# Patient Record
Sex: FEMALE | Race: WHITE | NOT HISPANIC OR LATINO | Employment: FULL TIME | URBAN - METROPOLITAN AREA
[De-identification: names, ages, dates, MRNs, and addresses within clinical notes are randomized per-mention and may not be internally consistent; named-entity substitution may affect disease eponyms.]

---

## 2020-06-02 ENCOUNTER — TELEPHONE (OUTPATIENT)
Dept: GASTROENTEROLOGY | Facility: CLINIC | Age: 65
End: 2020-06-02

## 2020-06-18 ENCOUNTER — TELEPHONE (OUTPATIENT)
Dept: GASTROENTEROLOGY | Facility: CLINIC | Age: 65
End: 2020-06-18

## 2020-06-19 ENCOUNTER — OFFICE VISIT (OUTPATIENT)
Dept: GASTROENTEROLOGY | Facility: CLINIC | Age: 65
End: 2020-06-19
Payer: COMMERCIAL

## 2020-06-19 VITALS
HEIGHT: 64 IN | BODY MASS INDEX: 30.41 KG/M2 | DIASTOLIC BLOOD PRESSURE: 78 MMHG | WEIGHT: 178.13 LBS | SYSTOLIC BLOOD PRESSURE: 122 MMHG | HEART RATE: 89 BPM | TEMPERATURE: 98.2 F

## 2020-06-19 DIAGNOSIS — R10.84 GENERALIZED ABDOMINAL PAIN: ICD-10-CM

## 2020-06-19 DIAGNOSIS — K21.9 GASTROESOPHAGEAL REFLUX DISEASE WITHOUT ESOPHAGITIS: Primary | ICD-10-CM

## 2020-06-19 PROCEDURE — 99244 OFF/OP CNSLTJ NEW/EST MOD 40: CPT | Performed by: INTERNAL MEDICINE

## 2020-06-19 RX ORDER — PANTOPRAZOLE SODIUM 40 MG/1
40 TABLET, DELAYED RELEASE ORAL 2 TIMES DAILY
Qty: 60 TABLET | Refills: 2 | Status: SHIPPED | OUTPATIENT
Start: 2020-06-19 | End: 2020-11-17

## 2020-06-19 RX ORDER — OMEPRAZOLE 40 MG/1
40 CAPSULE, DELAYED RELEASE ORAL DAILY
COMMUNITY
Start: 2018-01-12 | End: 2020-06-19

## 2020-06-19 RX ORDER — LISINOPRIL 5 MG/1
5 TABLET ORAL DAILY
COMMUNITY
Start: 2018-01-12 | End: 2021-04-07

## 2020-07-17 ENCOUNTER — HOSPITAL ENCOUNTER (OUTPATIENT)
Dept: RADIOLOGY | Facility: HOSPITAL | Age: 65
Discharge: HOME | End: 2020-07-17
Attending: INTERNAL MEDICINE
Payer: COMMERCIAL

## 2020-07-17 DIAGNOSIS — R10.84 GENERALIZED ABDOMINAL PAIN: ICD-10-CM

## 2020-07-17 DIAGNOSIS — K21.9 GASTROESOPHAGEAL REFLUX DISEASE WITHOUT ESOPHAGITIS: ICD-10-CM

## 2020-07-17 PROCEDURE — 74246 X-RAY XM UPR GI TRC 2CNTRST: CPT

## 2020-07-20 ENCOUNTER — TELEPHONE (OUTPATIENT)
Dept: GASTROENTEROLOGY | Facility: CLINIC | Age: 65
End: 2020-07-20

## 2020-07-20 NOTE — TELEPHONE ENCOUNTER
Left a voicemail for patient advising of normal study results  Advised if she has any questions to contact the office

## 2020-07-20 NOTE — TELEPHONE ENCOUNTER
----- Message from Mina Pickard MD sent at 7/20/2020  8:22 AM EDT -----  pls tell her the Xray study shows no cancer, no ulcer, and nothing alarming; pls tell her it is normal; she has FU scheduled with Nate Mosqueda

## 2020-07-20 NOTE — TELEPHONE ENCOUNTER
Pt called again I relayed her results of xray to her and she stated she will call back to schedule a f/u with [facility]

## 2020-09-24 ENCOUNTER — TELEPHONE (OUTPATIENT)
Dept: GASTROENTEROLOGY | Facility: CLINIC | Age: 65
End: 2020-09-24

## 2020-09-24 NOTE — TELEPHONE ENCOUNTER
EUFEMIA: Spoke with patient  History of GERD, chest pain, abdominal pain, heart burn    Patient c/o upper abdominal pain, and constipation for 2 weeks, with odorous gas  She states she has been taking mylanta daily which is not helping her constipation  She verbalized understanding that is for reflux  She is taking pantoprazole BID  Advised patient she should be taking miralax if she is constipated, she will restart this daily with her prune juice and follow-up in office

## 2020-09-24 NOTE — TELEPHONE ENCOUNTER
Stone July pt-  Patient has been experiencing terrbile stomach pain with gas     It has been happening for over 2 weeks @ 2 AM or later     Uses: [facility] [phone]  She has scheduled an appt 11/17 with Dr Ritter July     Please phone [phone] to advise  Bryn Ozuna

## 2020-11-17 ENCOUNTER — OFFICE VISIT (OUTPATIENT)
Dept: GASTROENTEROLOGY | Facility: CLINIC | Age: 65
End: 2020-11-17
Payer: COMMERCIAL

## 2020-11-17 VITALS
HEART RATE: 76 BPM | SYSTOLIC BLOOD PRESSURE: 124 MMHG | WEIGHT: 185 LBS | HEIGHT: 64 IN | TEMPERATURE: 98.2 F | DIASTOLIC BLOOD PRESSURE: 82 MMHG | BODY MASS INDEX: 31.58 KG/M2

## 2020-11-17 DIAGNOSIS — K21.9 GASTROESOPHAGEAL REFLUX DISEASE WITHOUT ESOPHAGITIS: ICD-10-CM

## 2020-11-17 DIAGNOSIS — K59.01 SLOW TRANSIT CONSTIPATION: ICD-10-CM

## 2020-11-17 DIAGNOSIS — R10.84 GENERALIZED ABDOMINAL PAIN: Primary | ICD-10-CM

## 2020-11-17 PROCEDURE — 99213 OFFICE O/P EST LOW 20 MIN: CPT | Performed by: INTERNAL MEDICINE

## 2020-11-17 RX ORDER — PANTOPRAZOLE SODIUM 40 MG/1
40 TABLET, DELAYED RELEASE ORAL DAILY
Qty: 60 TABLET | Refills: 2 | Status: SHIPPED | OUTPATIENT
Start: 2020-11-17

## 2021-04-07 ENCOUNTER — OFFICE VISIT (OUTPATIENT)
Dept: INTERNAL MEDICINE CLINIC | Facility: CLINIC | Age: 66
End: 2021-04-07
Payer: COMMERCIAL

## 2021-04-07 VITALS
TEMPERATURE: 98.2 F | WEIGHT: 183.4 LBS | HEART RATE: 82 BPM | DIASTOLIC BLOOD PRESSURE: 84 MMHG | BODY MASS INDEX: 31.31 KG/M2 | SYSTOLIC BLOOD PRESSURE: 124 MMHG | HEIGHT: 64 IN | OXYGEN SATURATION: 100 %

## 2021-04-07 DIAGNOSIS — B37.0 ORAL THRUSH: ICD-10-CM

## 2021-04-07 DIAGNOSIS — Z78.0 ASYMPTOMATIC POSTMENOPAUSAL STATE: ICD-10-CM

## 2021-04-07 DIAGNOSIS — M54.42 CHRONIC LEFT-SIDED LOW BACK PAIN WITH LEFT-SIDED SCIATICA: ICD-10-CM

## 2021-04-07 DIAGNOSIS — K59.01 SLOW TRANSIT CONSTIPATION: ICD-10-CM

## 2021-04-07 DIAGNOSIS — Z12.12 SCREENING FOR COLORECTAL CANCER: ICD-10-CM

## 2021-04-07 DIAGNOSIS — Z11.59 NEED FOR HEPATITIS C SCREENING TEST: ICD-10-CM

## 2021-04-07 DIAGNOSIS — G89.29 CHRONIC LEFT-SIDED LOW BACK PAIN WITH LEFT-SIDED SCIATICA: ICD-10-CM

## 2021-04-07 DIAGNOSIS — Z13.6 SCREENING FOR CARDIOVASCULAR CONDITION: ICD-10-CM

## 2021-04-07 DIAGNOSIS — Z12.31 ENCOUNTER FOR SCREENING MAMMOGRAM FOR BREAST CANCER: ICD-10-CM

## 2021-04-07 DIAGNOSIS — Z12.11 SCREENING FOR COLORECTAL CANCER: ICD-10-CM

## 2021-04-07 DIAGNOSIS — Z11.4 SCREENING FOR HIV (HUMAN IMMUNODEFICIENCY VIRUS): ICD-10-CM

## 2021-04-07 DIAGNOSIS — K21.9 GASTROESOPHAGEAL REFLUX DISEASE WITHOUT ESOPHAGITIS: Primary | ICD-10-CM

## 2021-04-07 DIAGNOSIS — E66.9 OBESITY (BMI 30-39.9): ICD-10-CM

## 2021-04-07 PROBLEM — R76.11 MANTOUX: POSITIVE: Status: ACTIVE | Noted: 2020-01-20

## 2021-04-07 PROBLEM — I10 ESSENTIAL HYPERTENSION: Status: ACTIVE | Noted: 2018-01-12

## 2021-04-07 PROBLEM — R31.29 MICROSCOPIC HEMATURIA: Status: ACTIVE | Noted: 2020-01-27

## 2021-04-07 PROBLEM — I10 ESSENTIAL HYPERTENSION: Status: RESOLVED | Noted: 2018-01-12 | Resolved: 2021-04-07

## 2021-04-07 PROCEDURE — 99214 OFFICE O/P EST MOD 30 MIN: CPT | Performed by: INTERNAL MEDICINE

## 2021-04-07 NOTE — PROGRESS NOTES
INTERNAL MEDICINE OFFICE VISIT  [facility] BEH[facility] MEDIC[facility] AT [place] [address]  Tel: [phone]      NAME: Yohannes Rodríguez  AGE: 72 y o  SEX: female  : 1955   MRN: [id number]    DATE: 2021  TIME: 5:51 PM      Assessment and Plan:  1  Gastroesophageal reflux disease without esophagitis   was told to continue taking the pantoprazole  - CBC and differential; Future  - Comprehensive metabolic panel; Future    2  Slow transit constipation   was advised to take the stool softener and MiraLax and follow up with GI    3  Chronic left-sided low back pain with left-sided sciatica   take pain medication as needed  She does not want to do physical therapy again    4  Oral thrush    - nystatin (MYCOSTATIN) 500,000 units/5 mL suspension; Apply 5 mL (500,000 Units total) to the mouth or throat 4 (four) times a day  Dispense: 473 mL; Refill: 3    5  Obesity (BMI 30-39  9)  BMI Counseling: Body mass index is 31 98 kg/m²  The BMI is above normal  Nutrition recommendations include decreasing portion sizes, encouraging healthy choices of fruits and vegetables and moderation in carbohydrate intake  Exercise recommendations include moderate physical activity 150 minutes/week  No pharmacotherapy was ordered  6  Screening for colorectal cancer    - Ambulatory referral to Gastroenterology; Future    7  Need for hepatitis C screening test    - Hepatitis C Antibody ([facility]); Future    8  Screening for HIV (human immunodeficiency virus)    - HIV 1/2 Antigen/Antibody (4th Generation) w Reflex [facility]; Future    9  Encounter for screening mammogram for breast cancer    - Mammo screening bilateral w 3d & cad; Future    10  Asymptomatic postmenopausal state    - DXA bone density spine hip and pelvis; Future    11  Screening for cardiovascular condition    - Lipid panel;  Future      - Counseling Documentation: patient was counseled regarding: diagnostic results, instructions for management, risk factor reductions, prognosis, patient and family education, risks and benefits of treatment options and importance of compliance with treatment  - Medication Side Effects: Adverse side effects of medications were reviewed with the patient/guardian today  Return for follow up visit in  4 months or earlier, if needed  Chief Complaint:  Chief Complaint   Patient presents with    Establish Care         History of Present Illness:    this is a new patient who is here to establish  GERD-she recently had a EGD done and was started on pantoprazole by GI   Constipation -she has a lot of constipation which causes lower abdominal pain  She was told to take MiraLax twice a week  Back pain -has chronic back pain for a long time with the pain radiating down to the left leg  She has done physical therapy multiple times in the past   Oral thrush -has sores in the mouth off and on   Obesity-was told to lose weight      Active Problem List:  Patient Active Problem List   Diagnosis    GERD (gastroesophageal reflux disease)    Generalized abdominal pain    Slow transit constipation    Mantoux: positive    Microscopic hematuria    Chronic left-sided low back pain with left-sided sciatica    Obesity (BMI 30-39  9)         Past Medical History:  Past Medical History:   Diagnosis Date    GERD (gastroesophageal reflux disease)          Past Surgical History:  Past Surgical History:   Procedure Laterality Date    TUBAL LIGATION           Family History:  Family History   Problem Relation Age of Onset    Diabetes Mother     Kidney disease Mother     Stroke Father          Social History:  Social History     Socioeconomic History    Marital status: Unknown     Spouse name: None    Number of children: None    Years of education: None    Highest education level: None   Occupational History    None   Social Needs    Financial resource strain: None    Food insecurity     Worry: None Inability: None    Transportation needs     Medical: None     Non-medical: None   Tobacco Use    Smoking status: Never Smoker    Smokeless tobacco: Never Used   Substance and Sexual Activity    Alcohol use: Never     Frequency: Never    Drug use: Never    Sexual activity: Not Currently     Partners: Male   Lifestyle    Physical activity     Days per week: 0 days     Minutes per session: 0 min    Stress: None   Relationships    Social connections     Talks on phone: None     Gets together: None     Attends Gnosticist service: None     Active member of club or organization: None     Attends meetings of clubs or organizations: None     Relationship status: None    Intimate partner violence     Fear of current or ex partner: None     Emotionally abused: None     Physically abused: None     Forced sexual activity: None   Other Topics Concern    None   Social History Narrative    None         Allergies:  No Known Allergies      Medications:    Current Outpatient Medications:     pantoprazole (PROTONIX) 40 mg tablet, Take 1 tablet (40 mg total) by mouth daily, Disp: 60 tablet, Rfl: 2    nystatin (MYCOSTATIN) 500,000 units/5 mL suspension, Apply 5 mL (500,000 Units total) to the mouth or throat 4 (four) times a day, Disp: 473 mL, Rfl: 3      The following portions of the patient's history were reviewed and updated as appropriate: past medical history, past surgical history, family history, social history, allergies, current medications and active problem list       Review of Systems:  Constitutional: Denies fever, chills, weight gain, weight loss, fatigue  Eyes: Denies eye redness, eye discharge, double vision, change in visual acuity  ENT: Denies hearing loss, tinnitus, sneezing, nasal congestion, nasal discharge, sore throat   Respiratory: Denies cough, expectoration, hemoptysis, shortness of breath, wheezing  Cardiovascular: Denies chest pain, palpitations, lower extremity swelling, orthopnea, PND  Gastrointestinal:  Complains of constipation and generalized abdominal pain, heartburn, denies nausea, vomiting, hematemesis, diarrhea, bloody stools  Genito-Urinary: Denies dysuria, frequency, difficulty in micturition, nocturia, incontinence  Musculoskeletal:  Has frequent back pain, denies joint pain, muscle pain  Neurologic: Denies confusion, lightheadedness, syncope, headache, focal weakness, sensory changes, seizures  Endocrine: Denies polyuria, polydipsia, temperature intolerance  Allergy and Immunology: Denies hives, insect bite sensitivity  Hematological and Lymphatic: Denies bleeding problems, swollen glands   Psychological: Denies depression, suicidal ideation, anxiety, panic, mood swings  Dermatological: Denies pruritus, rash, skin lesion changes      Vitals:  Vitals:    04/07/21 1721   BP: 124/84   Pulse: 82   Temp: 98 2 °F (36 8 °C)   SpO2: 100%       Body mass index is 31 98 kg/m²  Weight (last 2 days)     Date/Time   Weight    04/07/21 1721   83 2 (183 4)                Physical Examination:  General: Patient is not in acute distress  Awake, alert, responding to commands  No weight gain or loss  Head: Normocephalic  Atraumatic  Eyes: Conjunctiva and lids with no swelling, erythema or discharge  Both pupils normal sized, round and reactive to light  Sclera nonicteric  ENT: External examination of nose and ear normal  Otoscopic examination shows translucent tympanic membranes with patent canals without erythema  Oropharynx moist with no erythema, edema, exudate or lesions  Neck: Supple  JVP not raised  Trachea midline  No masses  No thyromegaly  Lungs: No signs of increased work of breathing or respiratory distress  Bilateral bronchovascular breath sounds with no crackles or rhonchi  Chest wall: No tenderness  Cardiovascular: Normal PMI  No thrills  Regular rate and rhythm  S1 and S2 normal  No murmur, rub or gallop  Gastrointestinal: Abdomen soft, nontender  No guarding or rigidity   Liver and spleen not palpable  Bowel sounds present  Neurologic: Cranial nerves II-XII intact  Cortical functions normal  Motor system - Reflexes 2+ and symmetrical  Sensations normal  Musculoskeletal: Gait normal  No joint tenderness  Integumentary: Skin normal with no rash or lesions  Lymphatic: No palpable lymph nodes in neck, axilla or groin  Extremities: No clubbing, cyanosis, edema or varicosities  Psychological: Judgement and insight normal  Mood and affect normal      Laboratory Results:  CBC with diff:   No results found for: WBC, RBC, HGB, HCT, MCV, MCH, RDW, PLT    CMP:  No results found for: CREATININE, BUN, NA, K, CL, CO2, GLUCOSE, PROT, ALKPHOS, ALT, AST, BILIDIR    No results found for: HGBA1C, MG, PHOS    No results found for: TROPONINI, CKMB, CKTOTAL    Lipid Profile:   No results found for: CHOL  No results found for: HDL  No results found for: LDLCALC  No results found for: TRIG    Imaging Results:  FL UGI w/ air routine  Narrative: UPPER GI SERIES  DOUBLE CONTRAST    INDICATION:  Gastroesophageal reflux disease  COMPARISON:  None    IMAGES:  15    FLUOROSCOPY TIME:   1 7 mft    TECHNIQUE:  The patient was given effervescent granules and barium  by mouth and images of the esophagus, stomach, and small bowel were obtained  FINDINGS:    The esophagus is normal in caliber  Esophageal motility is normal and emptying of contrast from the esophagus is prompt  There is no mucosal mass, ulceration or fold thickening identified  The stomach is unremarkable in size  The gastric mucosa is normal   No penetrating ulcers or masses  Contrast empties promptly into the duodenum  The duodenum is normal in caliber  The ligament of Treitz/duodenojejunal junction lies in a normal position  Gastroesophageal reflux was not observed  There is no hiatal hernia  Impression: Unremarkable upper GI series      Workstation performed: [id number]       Health Maintenance:  Health Maintenance   Topic Date Due  Hepatitis C Screening  Never done    MAMMOGRAM  Never done    DXA SCAN  Never done    HIV Screening  Never done    BMI: Followup Plan  Never done    Annual Physical  Never done    DTaP,Tdap,and Td Vaccines (1 - Tdap) Never done    Colorectal Cancer Screening  Never done    Influenza Vaccine (1) Never done    Pneumococcal Vaccine: 65+ Years (1 of 1 - PPSV23) Never done    COVID-19 Vaccine (2 - Pfizer 2-dose series) 02/15/2021    Fall Risk  04/07/2022    Depression Screening PHQ  04/07/2022    BMI: Adult  04/07/2022    HIB Vaccine  Aged Out    Hepatitis B Vaccine  Aged Out    IPV Vaccine  Aged Out    Hepatitis A Vaccine  Aged Out    Meningococcal ACWY Vaccine  Aged Out    HPV Vaccine  Aged Out     Immunization History   Administered Date(s) Administered    SARS-CoV-2 / COVID-19 mRNA IM (Pfizer-BioNTech) 01/25/2021         Ilsa Panda MD  4/7/2021,5:51 PM

## 2021-05-15 ENCOUNTER — APPOINTMENT (OUTPATIENT)
Dept: LAB | Facility: CLINIC | Age: 66
End: 2021-05-15
Payer: COMMERCIAL

## 2021-05-15 DIAGNOSIS — Z13.6 SCREENING FOR CARDIOVASCULAR CONDITION: ICD-10-CM

## 2021-05-15 DIAGNOSIS — K21.9 GASTROESOPHAGEAL REFLUX DISEASE WITHOUT ESOPHAGITIS: ICD-10-CM

## 2021-05-15 DIAGNOSIS — Z11.4 SCREENING FOR HIV (HUMAN IMMUNODEFICIENCY VIRUS): ICD-10-CM

## 2021-05-15 DIAGNOSIS — Z11.59 NEED FOR HEPATITIS C SCREENING TEST: ICD-10-CM

## 2021-05-15 LAB
ALBUMIN SERPL BCP-MCNC: 3.7 G/DL (ref 3.5–5)
ALP SERPL-CCNC: 96 U/L (ref 46–116)
ALT SERPL W P-5'-P-CCNC: 27 U/L (ref 12–78)
ANION GAP SERPL CALCULATED.3IONS-SCNC: 2 MMOL/L (ref 4–13)
AST SERPL W P-5'-P-CCNC: 11 U/L (ref 5–45)
BASOPHILS # BLD AUTO: 0.02 THOUSANDS/ΜL (ref 0–0.1)
BASOPHILS NFR BLD AUTO: 0 % (ref 0–1)
BILIRUB SERPL-MCNC: 0.7 MG/DL (ref 0.2–1)
BUN SERPL-MCNC: 13 MG/DL (ref 5–25)
CALCIUM SERPL-MCNC: 10.1 MG/DL (ref 8.3–10.1)
CHLORIDE SERPL-SCNC: 110 MMOL/L (ref 100–108)
CHOLEST SERPL-MCNC: 190 MG/DL (ref 50–200)
CO2 SERPL-SCNC: 28 MMOL/L (ref 21–32)
CREAT SERPL-MCNC: 0.74 MG/DL (ref 0.6–1.3)
EOSINOPHIL # BLD AUTO: 0.24 THOUSAND/ΜL (ref 0–0.61)
EOSINOPHIL NFR BLD AUTO: 4 % (ref 0–6)
ERYTHROCYTE [DISTWIDTH] IN BLOOD BY AUTOMATED COUNT: 13.7 % (ref 11.6–15.1)
GFR SERPL CREATININE-BSD FRML MDRD: 85 ML/MIN/1.73SQ M
GLUCOSE P FAST SERPL-MCNC: 94 MG/DL (ref 65–99)
HCT VFR BLD AUTO: 45.3 % (ref 34.8–46.1)
HCV AB SER QL: NORMAL
HDLC SERPL-MCNC: 66 MG/DL
HGB BLD-MCNC: 13.7 G/DL (ref 11.5–15.4)
IMM GRANULOCYTES # BLD AUTO: 0.01 THOUSAND/UL (ref 0–0.2)
IMM GRANULOCYTES NFR BLD AUTO: 0 % (ref 0–2)
LDLC SERPL CALC-MCNC: 107 MG/DL (ref 0–100)
LYMPHOCYTES # BLD AUTO: 2.15 THOUSANDS/ΜL (ref 0.6–4.47)
LYMPHOCYTES NFR BLD AUTO: 35 % (ref 14–44)
MCH RBC QN AUTO: 26.7 PG (ref 26.8–34.3)
MCHC RBC AUTO-ENTMCNC: 30.2 G/DL (ref 31.4–37.4)
MCV RBC AUTO: 88 FL (ref 82–98)
MONOCYTES # BLD AUTO: 0.52 THOUSAND/ΜL (ref 0.17–1.22)
MONOCYTES NFR BLD AUTO: 9 % (ref 4–12)
NEUTROPHILS # BLD AUTO: 3.18 THOUSANDS/ΜL (ref 1.85–7.62)
NEUTS SEG NFR BLD AUTO: 52 % (ref 43–75)
NONHDLC SERPL-MCNC: 124 MG/DL
NRBC BLD AUTO-RTO: 0 /100 WBCS
PLATELET # BLD AUTO: 182 THOUSANDS/UL (ref 149–390)
PMV BLD AUTO: 11.6 FL (ref 8.9–12.7)
POTASSIUM SERPL-SCNC: 4.5 MMOL/L (ref 3.5–5.3)
PROT SERPL-MCNC: 7.9 G/DL (ref 6.4–8.2)
RBC # BLD AUTO: 5.14 MILLION/UL (ref 3.81–5.12)
SODIUM SERPL-SCNC: 140 MMOL/L (ref 136–145)
TRIGL SERPL-MCNC: 84 MG/DL
WBC # BLD AUTO: 6.12 THOUSAND/UL (ref 4.31–10.16)

## 2021-05-15 PROCEDURE — 87389 HIV-1 AG W/HIV-1&-2 AB AG IA: CPT

## 2021-05-15 PROCEDURE — 85025 COMPLETE CBC W/AUTO DIFF WBC: CPT

## 2021-05-15 PROCEDURE — 86803 HEPATITIS C AB TEST: CPT

## 2021-05-15 PROCEDURE — 80061 LIPID PANEL: CPT

## 2021-05-15 PROCEDURE — 36415 COLL VENOUS BLD VENIPUNCTURE: CPT

## 2021-05-15 PROCEDURE — 80053 COMPREHEN METABOLIC PANEL: CPT

## 2021-05-17 LAB — HIV 1+2 AB+HIV1 P24 AG SERPL QL IA: NORMAL

## 2021-08-09 ENCOUNTER — TELEPHONE (OUTPATIENT)
Dept: INTERNAL MEDICINE CLINIC | Facility: CLINIC | Age: 66
End: 2021-08-09

## 2021-08-09 NOTE — TELEPHONE ENCOUNTER
Patient has travel plans for early Monday, 8/16/21  Order was put in to have a COVID-19 screening for travel  Patient would like to be put on schedule for test to be done for the 72 hour  Period prior to her travel date  Patient states airline says it must be a PCR test     Let patient know what day she can be added to the schedule    # [phone]

## 2021-08-09 NOTE — TELEPHONE ENCOUNTER
PT is traveling to [place] on Monday and wants to have Covid PCR test done at [facility]  Needs lab order placed in Epic so she can get this done      Please call PT when this is in chart  Al[address]: [phone]

## 2021-08-09 NOTE — TELEPHONE ENCOUNTER
Wing [place] spoke to patient - she needs to get a my chart account  to be able to get the results for Saturday to print out

## 2021-08-13 PROCEDURE — U0003 INFECTIOUS AGENT DETECTION BY NUCLEIC ACID (DNA OR RNA); SEVERE ACUTE RESPIRATORY SYNDROME CORONAVIRUS 2 (SARS-COV-2) (CORONAVIRUS DISEASE [COVID-19]), AMPLIFIED PROBE TECHNIQUE, MAKING USE OF HIGH THROUGHPUT TECHNOLOGIES AS DESCRIBED BY CMS-2020-01-R: HCPCS | Performed by: INTERNAL MEDICINE

## 2021-08-13 PROCEDURE — U0005 INFEC AGEN DETEC AMPLI PROBE: HCPCS | Performed by: INTERNAL MEDICINE

## 2021-08-18 ENCOUNTER — TELEPHONE (OUTPATIENT)
Dept: INTERNAL MEDICINE CLINIC | Facility: CLINIC | Age: 66
End: 2021-08-18

## 2021-08-18 NOTE — TELEPHONE ENCOUNTER
----- Message from Estephania Oglesby MD sent at 8/18/2021  2:58 PM EDT -----   Negative for coronavirus

## 2021-10-04 ENCOUNTER — OFFICE VISIT (OUTPATIENT)
Dept: INTERNAL MEDICINE CLINIC | Facility: CLINIC | Age: 66
End: 2021-10-04
Payer: COMMERCIAL

## 2021-10-04 VITALS
SYSTOLIC BLOOD PRESSURE: 126 MMHG | RESPIRATION RATE: 17 BRPM | OXYGEN SATURATION: 98 % | HEART RATE: 81 BPM | TEMPERATURE: 97.5 F | HEIGHT: 63 IN | DIASTOLIC BLOOD PRESSURE: 78 MMHG | BODY MASS INDEX: 32.71 KG/M2 | WEIGHT: 184.6 LBS

## 2021-10-04 DIAGNOSIS — M54.2 NECK PAIN: Primary | ICD-10-CM

## 2021-10-04 DIAGNOSIS — G89.29 CHRONIC MIDLINE LOW BACK PAIN WITHOUT SCIATICA: ICD-10-CM

## 2021-10-04 DIAGNOSIS — M54.50 CHRONIC MIDLINE LOW BACK PAIN WITHOUT SCIATICA: ICD-10-CM

## 2021-10-04 DIAGNOSIS — R60.0 BILATERAL EDEMA OF LOWER EXTREMITY: ICD-10-CM

## 2021-10-04 PROCEDURE — 99213 OFFICE O/P EST LOW 20 MIN: CPT | Performed by: INTERNAL MEDICINE

## 2022-02-14 ENCOUNTER — OFFICE VISIT (OUTPATIENT)
Dept: INTERNAL MEDICINE CLINIC | Facility: CLINIC | Age: 67
End: 2022-02-14
Payer: COMMERCIAL

## 2022-02-14 VITALS
HEIGHT: 63 IN | WEIGHT: 185.2 LBS | HEART RATE: 90 BPM | DIASTOLIC BLOOD PRESSURE: 74 MMHG | RESPIRATION RATE: 18 BRPM | SYSTOLIC BLOOD PRESSURE: 118 MMHG | BODY MASS INDEX: 32.82 KG/M2 | OXYGEN SATURATION: 97 %

## 2022-02-14 DIAGNOSIS — M25.511 CHRONIC RIGHT SHOULDER PAIN: Primary | ICD-10-CM

## 2022-02-14 DIAGNOSIS — K21.9 GASTROESOPHAGEAL REFLUX DISEASE WITHOUT ESOPHAGITIS: ICD-10-CM

## 2022-02-14 DIAGNOSIS — G89.29 CHRONIC RIGHT SHOULDER PAIN: Primary | ICD-10-CM

## 2022-02-14 PROCEDURE — 99213 OFFICE O/P EST LOW 20 MIN: CPT | Performed by: INTERNAL MEDICINE

## 2022-02-14 NOTE — PROGRESS NOTES
INTERNAL MEDICINE FOLLOW-UP OFFICE VISIT  [facility] of BEHAVIORAL MEDICINE AT [facility]    NAME: Khloe Munguia  AGE: 77 y o  SEX: female  : 1955   MRN: [id number]    DATE: 2022  TIME: 3:12 PM    Assessment and Plan     Diagnoses and all orders for this visit:    Chronic right shoulder pain  -     Ambulatory Referral to Orthopedic Surgery; Future  -     XR shoulder 2+ vw right; Future    Gastroesophageal reflux disease without esophagitis     she was advised to cut down the caffeine, spices and citrus from her diet and continue taking the pantoprazole    - Counseling Documentation: patient was counseled regarding: instructions for management, risk factor reductions, prognosis, patient and family education, risks and benefits of treatment options and importance of compliance with treatment  - Medication Side Effects: Adverse side effects of medications were reviewed with the patient/guardian today  Return to office in:  As needed    Chief Complaint     Chief Complaint   Patient presents with    Hypertension       History of Present Illness     HPI   patient came in because she thought her blood pressure was high but it was absolutely controlled when she was in the office  She has never had high blood pressure in the past     She complains of pain in her right shoulder radiating down to the upper arm  She has been taking the pantoprazole but is continuing to have symptoms of upper abdominal pain  The following portions of the patient's history were reviewed and updated as appropriate: allergies, current medications, past family history, past medical history, past social history, past surgical history and problem list     Review of Systems     Review of Systems   Constitutional: Negative for chills, diaphoresis, fatigue and fever  HENT: Negative for congestion, ear discharge, ear pain, hearing loss, postnasal drip, rhinorrhea, sinus pressure, sinus pain, sneezing, sore throat and voice change  Eyes: Negative for pain, discharge, redness and visual disturbance  Respiratory: Negative for cough, chest tightness, shortness of breath and wheezing  Cardiovascular: Negative for chest pain, palpitations and leg swelling  Gastrointestinal: Positive for abdominal pain  Negative for abdominal distention, blood in stool, constipation, diarrhea, nausea and vomiting  Endocrine: Negative for cold intolerance, heat intolerance, polydipsia, polyphagia and polyuria  Genitourinary: Negative for dysuria, flank pain, frequency, hematuria and urgency  Musculoskeletal: Positive for arthralgias  Negative for back pain, gait problem, joint swelling, myalgias, neck pain and neck stiffness  Has pain in the right shoulder and upper arm   Skin: Negative for rash  Neurological: Negative for dizziness, tremors, syncope, facial asymmetry, speech difficulty, weakness, light-headedness, numbness and headaches  Hematological: Does not bruise/bleed easily  Psychiatric/Behavioral: Negative for behavioral problems, confusion and sleep disturbance  The patient is not nervous/anxious  Active Problem List     Patient Active Problem List   Diagnosis    GERD (gastroesophageal reflux disease)    Generalized abdominal pain    Slow transit constipation    Mantoux: positive    Microscopic hematuria    Chronic midline low back pain without sciatica    Obesity (BMI 30-39  9)    Bilateral edema of lower extremity    Neck pain    Chronic right shoulder pain       Objective     /74 (BP Location: Left arm, Patient Position: Sitting, Cuff Size: Large)   Pulse 90   Resp 18   Ht 5' 3" (1 6 m)   Wt 84 kg (185 lb 3 2 oz)   SpO2 97%   BMI 32 81 kg/m²     Physical Exam  Constitutional:       General: She is not in acute distress  Appearance: She is well-developed  She is not diaphoretic  HENT:      Head: Normocephalic and atraumatic        Right Ear: External ear normal       Left Ear: External ear normal       Nose: Nose normal    Eyes:      General: No scleral icterus  Right eye: No discharge  Left eye: No discharge  Conjunctiva/sclera: Conjunctivae normal    Neck:      Thyroid: No thyromegaly  Vascular: No JVD  Trachea: No tracheal deviation  Cardiovascular:      Rate and Rhythm: Normal rate and regular rhythm  Heart sounds: Normal heart sounds  No murmur heard  No friction rub  No gallop  Pulmonary:      Effort: Pulmonary effort is normal  No respiratory distress  Breath sounds: Normal breath sounds  No wheezing or rales  Chest:      Chest wall: No tenderness  Abdominal:      General: Bowel sounds are normal  There is no distension  Palpations: Abdomen is soft  Tenderness: There is abdominal tenderness  There is no guarding or rebound  Comments:  Has epigastric tenderness   Musculoskeletal:         General: Tenderness present  Normal range of motion  Cervical back: Normal range of motion and neck supple  Comments:  Has tenderness and restriction of movements of the right shoulder   Lymphadenopathy:      Cervical: No cervical adenopathy  Skin:     General: Skin is warm and dry  Findings: No erythema or rash  Neurological:      Mental Status: She is alert and oriented to person, place, and time  Cranial Nerves: No cranial nerve deficit  Motor: No abnormal muscle tone        Coordination: Coordination normal    Psychiatric:         Judgment: Judgment normal            Current Medications       Current Outpatient Medications:     nystatin (MYCOSTATIN) 500,000 units/5 mL suspension, Apply 5 mL (500,000 Units total) to the mouth or throat 4 (four) times a day (Patient taking differently: Apply 500,000 Units to the mouth or throat as needed ), Disp: 473 mL, Rfl: 3    pantoprazole (PROTONIX) 40 mg tablet, Take 1 tablet (40 mg total) by mouth daily, Disp: 60 tablet, Rfl: 2    Health Maintenance     Health Maintenance Topic Date Due    DXA SCAN  Never done    Annual Physical  Never done    DTaP,Tdap,and Td Vaccines (1 - Tdap) Never done    Breast Cancer Screening: Mammogram  Never done    Osteoporosis Screening  Never done    Colorectal Cancer Screening  Never done    Pneumococcal Vaccine: 65+ Years (1 of 1 - PPSV23) Never done    COVID-19 Vaccine (2 - Pfizer 3-dose series) 02/15/2021    Influenza Vaccine (1) Never done    Fall Risk  04/07/2022    Depression Screening  04/07/2022    BMI: Followup Plan  04/07/2022    BMI: Adult  02/14/2023    Hepatitis C Screening  Completed    HIB Vaccine  Aged Out    Hepatitis B Vaccine  Aged Out    IPV Vaccine  Aged Out    Hepatitis A Vaccine  Aged Out    Meningococcal ACWY Vaccine  Aged Out    HPV Vaccine  Aged Out     Immunization History   Administered Date(s) Administered    COVID-19 PFIZER VACCINE 0 3 ML IM 01/25/2021         Irene Peña MD  [address] [facility] of BEHAVIORAL MEDICINE AT [facility]

## 2022-02-28 ENCOUNTER — OFFICE VISIT (OUTPATIENT)
Dept: OBGYN CLINIC | Facility: CLINIC | Age: 67
End: 2022-02-28
Payer: COMMERCIAL

## 2022-02-28 ENCOUNTER — APPOINTMENT (OUTPATIENT)
Dept: RADIOLOGY | Facility: CLINIC | Age: 67
End: 2022-02-28
Payer: COMMERCIAL

## 2022-02-28 VITALS
BODY MASS INDEX: 31.41 KG/M2 | HEIGHT: 64 IN | WEIGHT: 184 LBS | HEART RATE: 94 BPM | DIASTOLIC BLOOD PRESSURE: 79 MMHG | SYSTOLIC BLOOD PRESSURE: 115 MMHG

## 2022-02-28 DIAGNOSIS — M25.561 RIGHT KNEE PAIN, UNSPECIFIED CHRONICITY: ICD-10-CM

## 2022-02-28 DIAGNOSIS — M25.511 CHRONIC RIGHT SHOULDER PAIN: ICD-10-CM

## 2022-02-28 DIAGNOSIS — G89.29 CHRONIC RIGHT SHOULDER PAIN: ICD-10-CM

## 2022-02-28 DIAGNOSIS — M25.511 RIGHT SHOULDER PAIN, UNSPECIFIED CHRONICITY: ICD-10-CM

## 2022-02-28 DIAGNOSIS — M25.562 LEFT KNEE PAIN, UNSPECIFIED CHRONICITY: ICD-10-CM

## 2022-02-28 DIAGNOSIS — M25.511 RIGHT SHOULDER PAIN, UNSPECIFIED CHRONICITY: Primary | ICD-10-CM

## 2022-02-28 DIAGNOSIS — M75.81 TENDINITIS OF RIGHT ROTATOR CUFF: ICD-10-CM

## 2022-02-28 PROCEDURE — 73564 X-RAY EXAM KNEE 4 OR MORE: CPT

## 2022-02-28 PROCEDURE — 99203 OFFICE O/P NEW LOW 30 MIN: CPT | Performed by: ORTHOPAEDIC SURGERY

## 2022-02-28 PROCEDURE — 73030 X-RAY EXAM OF SHOULDER: CPT

## 2022-02-28 NOTE — PROGRESS NOTES
Patient Name:  Earlene Julien  MRN:  [id number]    [address] [place] [address]     1  Right shoulder pain, unspecified chronicity  -     XR shoulder 2+ vw right; Future; Expected date: 02/28/2022    2  Chronic right shoulder pain  -     Ambulatory Referral to Orthopedic Surgery    3  Right knee pain, unspecified chronicity  -     XR knee 4+ vw right injury; Future; Expected date: 02/28/2022    4  Left knee pain, unspecified chronicity  -     XR knee 4+ vw left injury; Future; Expected date: 02/28/2022        77 y o  female with Right shoulder calcific/rotator cuff tendonitis and bilateral knee patellofemoral osteoarthritis  X-rays reviewed in office today with patient  In regards to Right shoulder, advised patient nonoperative management of rotator cuff/calcific tendonitis including outpatient PT for range of motion and strengthening, possible corticosteroid injection, OTC oral analgesics as needed  Patient would like to proceed forward with outpatient PT at this time and hold off on corticosteroid injection  If patient symptoms were to continue despite aforementioned nonoperative management, can consider MRI of Right shoulder for further evaluation of rotator cuff musculature  Verbalized understanding of the above  In regards to bilateral knees, likely patellofemoral etiology of pain discussed with patient  Educated patient about home exercises including short arc quad sets and straight leg raises  Patient may continue OTC oral analgesics and ice application to bilateral knees as needed for pain relief  Will hold off on PT at this time as she is already attending for Right shoulder  Can consider PT script placement for knees at follow up appointment  She will follow up in office in 6-8 weeks for reevaluation of Right shoulder and bilateral knees      Chief Complaint     Right shoulder and bilateral knee pain    History of the Present Illness     Earlene Julien is a 77 y o  female with Right shoulder and bilateral knee pain  In regards to Right shoulder, pain has been ongoing since November without trauma, injury, or inciting event  She notes difficulty with range of motion and overhead lifting  Admits to nighttime awakenings that have persisted through duration of pain since November  She has been administering Aleve for pain relief, but has not yet attended out patient PT  In regards to bilateral knees, patient admits to ongoing pain for years  She locates pain to anteromedial aspect of bilateral knees which is exacerbated with prolonged sitting and attempting to stand and stair ambulation  She occasionally applies heat/ice to bilateral knees for mild pain relief  Review of Systems     Review of Systems   Constitutional: Negative for chills and fever  HENT: Negative for ear pain and sore throat  Eyes: Negative for pain and visual disturbance  Respiratory: Negative for cough and shortness of breath  Cardiovascular: Negative for chest pain and palpitations  Gastrointestinal: Negative for abdominal pain and vomiting  Genitourinary: Negative for dysuria and hematuria  Musculoskeletal: Negative for arthralgias and back pain  Skin: Negative for color change and rash  Neurological: Negative for seizures and syncope  All other systems reviewed and are negative  Physical Exam     /79   Pulse 94   Ht 5' 4" (1 626 m)   Wt 83 5 kg (184 lb)   BMI 31 58 kg/m²     Right Shoulder: Active range of motion   150-160 degrees forward flexion  150-160 degrees abduction  60 degrees external rotation   L1 internal rotation      Passive range of motion   170 degrees of forward flexion   There is tenderness present over the proximal biceps tendon and greater tuberosity of humerus  There is 5/5 strength with external rotation testing at the side      Empty can testing elicits mild pain with subtle weakness  Belly press test is negative  James test is positive  [place]'s test is positive Speed's test is Positive  The patient is neurovascularly intact distally in the extremity  Eyes:  Anicteric sclerae  Neck:  Supple  Lungs:  Normal respiratory effort  Cardiovascular:  Capillary refill is less than 2 seconds  Skin:  Intact without erythema  Neurologic:  Sensation grossly intact to light touch  Psychiatric:  Mood and affect are appropriate  Data Review     I have personally reviewed pertinent films in PACS, and my interpretation follows:    X-rays taken 02/28/2022 of Right shoulder demonstrates calcific density superior to humeral head, possible calcific tendonitis  No acute fracture, dislocation; glenohumeral joint space well maintained  Past Medical History:   Diagnosis Date    GERD (gastroesophageal reflux disease)        Past Surgical History:   Procedure Laterality Date    TUBAL LIGATION         No Known Allergies    Current Outpatient Medications on File Prior to Visit   Medication Sig Dispense Refill    Naproxen Sodium (ALEVE PO) Take by mouth      nystatin (MYCOSTATIN) 500,000 units/5 mL suspension Apply 5 mL (500,000 Units total) to the mouth or throat 4 (four) times a day (Patient taking differently: Apply 500,000 Units to the mouth or throat as needed ) 473 mL 3    pantoprazole (PROTONIX) 40 mg tablet Take 1 tablet (40 mg total) by mouth daily 60 tablet 2     No current facility-administered medications on file prior to visit         Social History     Tobacco Use    Smoking status: Never Smoker    Smokeless tobacco: Never Used   Vaping Use    Vaping Use: Never used   Substance Use Topics    Alcohol use: Never    Drug use: Never       Family History   Problem Relation Age of Onset    Diabetes Mother     Kidney disease Mother     Stroke Father              Procedures Performed     Procedures  None       Maria Guadalupe Larsen PA-C

## 2022-03-25 ENCOUNTER — OFFICE VISIT (OUTPATIENT)
Dept: INTERNAL MEDICINE CLINIC | Facility: CLINIC | Age: 67
End: 2022-03-25
Payer: COMMERCIAL

## 2022-03-25 VITALS
TEMPERATURE: 98 F | HEART RATE: 90 BPM | RESPIRATION RATE: 18 BRPM | WEIGHT: 182.3 LBS | SYSTOLIC BLOOD PRESSURE: 124 MMHG | BODY MASS INDEX: 31.12 KG/M2 | OXYGEN SATURATION: 98 % | DIASTOLIC BLOOD PRESSURE: 82 MMHG | HEIGHT: 64 IN

## 2022-03-25 DIAGNOSIS — L03.116 CELLULITIS OF LEFT LOWER EXTREMITY: Primary | ICD-10-CM

## 2022-03-25 DIAGNOSIS — M25.511 CHRONIC RIGHT SHOULDER PAIN: ICD-10-CM

## 2022-03-25 DIAGNOSIS — G89.29 CHRONIC RIGHT SHOULDER PAIN: ICD-10-CM

## 2022-03-25 PROCEDURE — 99213 OFFICE O/P EST LOW 20 MIN: CPT | Performed by: INTERNAL MEDICINE

## 2022-03-25 RX ORDER — DIAPER,BRIEF,INFANT-TODD,DISP
EACH MISCELLANEOUS 2 TIMES DAILY
COMMUNITY
Start: 2022-03-21 | End: 2022-03-31

## 2022-03-25 RX ORDER — CEPHALEXIN 500 MG/1
500 CAPSULE ORAL EVERY 8 HOURS SCHEDULED
Qty: 21 CAPSULE | Refills: 0 | Status: SHIPPED | OUTPATIENT
Start: 2022-03-25 | End: 2022-04-01

## 2022-03-25 NOTE — PATIENT INSTRUCTIONS
Adhesive Capsulitis   WHAT YOU NEED TO KNOW:   What is adhesive capsulitis? Adhesive capsulitis happens when tissues in your shoulder tighten and swell  The condition is often called frozen shoulder because the swollen tissues cause pain and decrease your shoulder movement  What are the signs and symptoms of adhesive capsulitis? · Shoulder pain and stiffness that gets worse over time    · Pain at night that wakes you    · An ache in your shoulder even at rest but that gets worse with movement    · Muscle spasms in your neck, shoulder joint, or near your collarbone    · Trouble reaching over your head or behind you    · Muscle weakness    What increases my risk for adhesive capsulitis? · Age 36 or older    · Being a woman    · Not being able to do physical activity    · A past shoulder injury or surgery    · A medical condition, such as diabetes, thyroid disease, or heart or lung disease    What are the signs and symptoms of adhesive capsulitis? Adhesive capsulitis may last from several months to years before it gets better on its own  You can have adhesive capsulitis in one or both shoulders  The condition has 3 stages:  · Stage 1  is called the freezing or painful stage and may last 2 to 9 months  · Stage 2  is called the adhesive stage and may last 4 to 12 months  You may have less pain  You may still have pain when you move your arm to reach  Your shoulder may still be stiff, and you may not be able to move your shoulder much  · Stage 3  is the recovery stage and may last from 5 months to more than 2 years  Your shoulder movement may slowly start to get better  You may also begin to have less pain  How is adhesive capsulitis diagnosed? Your healthcare provider will do an exam  He or she will check your neck and shoulder  He or she will check how your shoulder moves and how strong it is  Your provider may move your arm in different positions while you stand or lie down   You may also need the following:  · X-ray or MRI  pictures may be taken of the bones and tissues inside your shoulder  An x-ray may show if your shoulder pain and stiffness is from another medical problem  An MRI may show if your shoulder joint has narrowed  Never enter the MRI room with anything metal  Metal can cause serious injury  Tell the healthcare provider if you have any metal in or on your body  · A joint x-ray  is a picture of the bones and tissues in your joints  You may be given contrast liquid to help the pictures show up better  Tell a healthcare provider if you have ever had an allergic reaction to contrast liquid  How is adhesive capsulitis treated? The goal of treatment is to help you regain as much shoulder movement as possible  Treatment will depend on what stage you are in  Ask your healthcare provider about these and other treatments for adhesive capsulitis:  · Prescription pain medicine  may be given  Ask your healthcare provider how to take this medicine safely  Some prescription pain medicines contain acetaminophen  Do not take other medicines that contain acetaminophen without talking to your healthcare provider  Too much acetaminophen may cause liver damage  Prescription pain medicine may cause constipation  Ask your healthcare provider how to prevent or treat constipation  · NSAIDs  help decrease swelling and pain or fever  This medicine is available with or without a doctor's order  NSAIDs can cause stomach bleeding or kidney problems in certain people  If you take blood thinner medicine, always ask your healthcare provider if NSAIDs are safe for you  Always read the medicine label and follow directions  · Steroid medicine  helps decrease pain and swelling  Healthcare providers may give this medicine as a shot into your shoulder  · Physical therapy:  A physical therapist teaches you exercises to help improve movement and strength, and to decrease pain       · Manipulation  is a procedure used to move your shoulder  You will be given anesthesia to make you sleep through this procedure  Manipulation releases tightness in your shoulder and improves movement  · Surgery  may be used to cut the tissues in your shoulder to release the tightness  During surgery, swollen or damaged tissue may also be removed  Surgery may be needed if other treatments do not help  How can I help manage adhesive capsulitis? · Stretches:      ? Doorway stretch:   a doorway with your painful arm bent at the elbow  Place your hand on the door frame and turn your body away from the door frame  Hold this position for 30 seconds  Relax and repeat  ? Forward stretch:  Lie on your back with your legs straight out  Use your healthy arm to push your painful arm up over your head until you feel a gentle stretch  Hold this position for 15 seconds  Slowly lower your arm to the starting position  Relax and repeat  ? Crossover stretch:  Use your healthy arm to gently pull your painful arm across your chest just below your chin  Pull until you feel a gentle stretch  Hold this position for 30 seconds  Relax and repeat  · Apply ice as directed  Ice helps decrease pain and swelling  Apply ice to help ease pain after stretching  Use an ice pack, or put crushed ice in a plastic bag  Cover it with a towel before you apply it to your shoulder  Apply ice for 15 to 20 minutes every hour, or as directed  · Apply heat as directed  Heat helps relax muscles and may help improve shoulder movement  Use a heat pack, or soak a small towel in warm water  Wring out the extra water before you apply the towel to your shoulder  Apply heat for 20 to 30 minutes every hour, or as directed  When should I seek immediate care? · You have new or increased trouble moving your arm  When should I contact my healthcare provider? · You have worse pain and stiffness in your shoulder      · You have questions or concerns about your condition or care  CARE AGREEMENT:   You have the right to help plan your care  Learn about your health condition and how it may be treated  Discuss treatment options with your healthcare providers to decide what care you want to receive  You always have the right to refuse treatment  The above information is an  only  It is not intended as medical advice for individual conditions or treatments  Talk to your doctor, nurse or pharmacist before following any medical regimen to see if it is safe and effective for you  © Copyright [employer] 2022 Information is for End User's use only and may not be sold, redistributed or otherwise used for commercial purposes   All illustrations and images included in CareNotes® are the copyrighted property of A D A M , Inc  or [address] [place] [address]

## 2022-03-25 NOTE — PROGRESS NOTES
INTERNAL MEDICINE FOLLOW-UP OFFICE VISIT  [facility] of BEHAVIORAL MEDICINE AT [facility]    NAME: Mallory Gagnon  AGE: 77 y o  SEX: female  : 1955   MRN: [id number]    DATE: 3/25/2022  TIME: 4:49 PM    Assessment and Plan     Diagnoses and all orders for this visit:    Cellulitis of left lower extremity  -     cephalexin (KEFLEX) 500 mg capsule; Take 1 capsule (500 mg total) by mouth every 8 (eight) hours for 7 days    Chronic right shoulder pain  Was told to do shoulder exercises and follow-up with orthopedics    Other orders  -     bacitracin ointment; Apply topically 2 (two) times a day        - Counseling Documentation: patient was counseled regarding: instructions for management, risk factor reductions, prognosis, patient and family education, risks and benefits of treatment options and importance of compliance with treatment  - Medication Side Effects: Adverse side effects of medications were reviewed with the patient/guardian today  Return to office in: as needed    Chief Complaint     Chief Complaint   Patient presents with    Follow-up     ED visit [id number] for Left leg pain and swelling        History of Present Illness     HPI  Patient complains of redness and swelling of her left lower extremity for the last few days  She was at the [facility] ER and was given bacitracin ointment and sent back  The redness and swelling continues  She also has a lot of pain in her right shoulder and has been following up with orthopedics     The following portions of the patient's history were reviewed and updated as appropriate: allergies, current medications, past family history, past medical history, past social history, past surgical history and problem list     Review of Systems     Review of Systems   Constitutional: Negative for chills, diaphoresis, fatigue and fever     HENT: Negative for congestion, ear discharge, ear pain, hearing loss, postnasal drip, rhinorrhea, sinus pressure, sinus pain, sneezing, sore throat and voice change  Eyes: Negative for pain, discharge, redness and visual disturbance  Respiratory: Negative for cough, chest tightness, shortness of breath and wheezing  Cardiovascular: Negative for chest pain, palpitations and leg swelling  Gastrointestinal: Negative for abdominal distention, abdominal pain, blood in stool, constipation, diarrhea, nausea and vomiting  Endocrine: Negative for cold intolerance, heat intolerance, polydipsia, polyphagia and polyuria  Genitourinary: Negative for dysuria, flank pain, frequency, hematuria and urgency  Musculoskeletal: Positive for arthralgias  Negative for back pain, gait problem, joint swelling, myalgias, neck pain and neck stiffness  Skin: Positive for color change  Negative for rash  Neurological: Negative for dizziness, tremors, syncope, facial asymmetry, speech difficulty, weakness, light-headedness, numbness and headaches  Hematological: Does not bruise/bleed easily  Psychiatric/Behavioral: Negative for behavioral problems, confusion and sleep disturbance  The patient is not nervous/anxious  Active Problem List     Patient Active Problem List   Diagnosis    GERD (gastroesophageal reflux disease)    Generalized abdominal pain    Slow transit constipation    Mantoux: positive    Microscopic hematuria    Chronic midline low back pain without sciatica    Obesity (BMI 30-39  9)    Bilateral edema of lower extremity    Neck pain    Chronic right shoulder pain       Objective     /82 (BP Location: Left arm, Patient Position: Sitting, Cuff Size: Large)   Pulse 90   Temp 98 °F (36 7 °C) (Tympanic)   Resp 18   Ht 5' 4" (1 626 m)   Wt 82 7 kg (182 lb 4 8 oz)   SpO2 98%   BMI 31 29 kg/m²     Physical Exam  Constitutional:       General: She is not in acute distress  Appearance: She is well-developed  She is not diaphoretic  HENT:      Head: Normocephalic and atraumatic        Right Ear: External ear normal       Left Ear: External ear normal       Nose: Nose normal    Eyes:      General: No scleral icterus  Right eye: No discharge  Left eye: No discharge  Conjunctiva/sclera: Conjunctivae normal    Neck:      Thyroid: No thyromegaly  Vascular: No JVD  Trachea: No tracheal deviation  Cardiovascular:      Rate and Rhythm: Normal rate and regular rhythm  Heart sounds: Normal heart sounds  No murmur heard  No friction rub  No gallop  Pulmonary:      Effort: Pulmonary effort is normal  No respiratory distress  Breath sounds: Normal breath sounds  No wheezing or rales  Chest:      Chest wall: No tenderness  Abdominal:      General: Bowel sounds are normal  There is no distension  Palpations: Abdomen is soft  Tenderness: There is no abdominal tenderness  There is no guarding or rebound  Musculoskeletal:         General: Tenderness present  Normal range of motion  Cervical back: Normal range of motion and neck supple  Comments: Tenderness present in the right shoulder   Lymphadenopathy:      Cervical: No cervical adenopathy  Skin:     General: Skin is warm and dry  Findings: Erythema present  No rash  Comments:  Has cellulitis of the left leg   Neurological:      Mental Status: She is alert and oriented to person, place, and time  Cranial Nerves: No cranial nerve deficit  Motor: No abnormal muscle tone        Coordination: Coordination normal    Psychiatric:         Judgment: Judgment normal            Current Medications       Current Outpatient Medications:     bacitracin ointment, Apply topically 2 (two) times a day, Disp: , Rfl:     nystatin (MYCOSTATIN) 500,000 units/5 mL suspension, Apply 5 mL (500,000 Units total) to the mouth or throat 4 (four) times a day (Patient taking differently: Apply 500,000 Units to the mouth or throat as needed ), Disp: 473 mL, Rfl: 3    pantoprazole (PROTONIX) 40 mg tablet, Take 1 tablet (40 mg total) by mouth daily, Disp: 60 tablet, Rfl: 2    cephalexin (KEFLEX) 500 mg capsule, Take 1 capsule (500 mg total) by mouth every 8 (eight) hours for 7 days, Disp: 21 capsule, Rfl: 0    Health Maintenance     Health Maintenance   Topic Date Due    DXA SCAN  Never done    Annual Physical  Never done    DTaP,Tdap,and Td Vaccines (1 - Tdap) Never done    Breast Cancer Screening: Mammogram  Never done    Osteoporosis Screening  Never done    Colorectal Cancer Screening  Never done    Pneumococcal Vaccine: 65+ Years (1 of 1 - PPSV23) Never done    Influenza Vaccine (1) Never done    COVID-19 Vaccine (3 - Booster for SinCola series) 03/27/2022    Fall Risk  04/07/2022    Depression Screening  04/07/2022    BMI: Followup Plan  04/07/2022    BMI: Adult  03/25/2023    Hepatitis C Screening  Completed    HIB Vaccine  Aged Out    Hepatitis B Vaccine  Aged Out    IPV Vaccine  Aged Out    Hepatitis A Vaccine  Aged Out    Meningococcal ACWY Vaccine  Aged Out    HPV Vaccine  Aged Out     Immunization History   Administered Date(s) Administered    COVID-19 PFIZER VACCINE 0 3 ML IM 01/25/2021, 10/27/2021         Mauri Henderson MD  [id number] [facility] of BEHAVIORAL MEDICINE AT [facility]

## 2022-06-13 ENCOUNTER — OFFICE VISIT (OUTPATIENT)
Dept: OBGYN CLINIC | Facility: CLINIC | Age: 67
End: 2022-06-13
Payer: COMMERCIAL

## 2022-06-13 VITALS
DIASTOLIC BLOOD PRESSURE: 80 MMHG | HEART RATE: 68 BPM | WEIGHT: 181.2 LBS | BODY MASS INDEX: 30.93 KG/M2 | SYSTOLIC BLOOD PRESSURE: 124 MMHG | HEIGHT: 64 IN

## 2022-06-13 DIAGNOSIS — M17.0 PRIMARY OSTEOARTHRITIS OF BOTH KNEES: ICD-10-CM

## 2022-06-13 DIAGNOSIS — M24.811 INTERNAL DERANGEMENT OF RIGHT SHOULDER: ICD-10-CM

## 2022-06-13 DIAGNOSIS — M75.81 TENDINITIS OF RIGHT ROTATOR CUFF: Primary | ICD-10-CM

## 2022-06-13 DIAGNOSIS — M75.41 SUBACROMIAL IMPINGEMENT, RIGHT: ICD-10-CM

## 2022-06-13 DIAGNOSIS — M25.511 CHRONIC RIGHT SHOULDER PAIN: ICD-10-CM

## 2022-06-13 DIAGNOSIS — G89.29 CHRONIC RIGHT SHOULDER PAIN: ICD-10-CM

## 2022-06-13 PROCEDURE — 99214 OFFICE O/P EST MOD 30 MIN: CPT | Performed by: ORTHOPAEDIC SURGERY

## 2022-06-13 NOTE — PROGRESS NOTES
Patient Name:  Tim Hernandez  MRN:  [id number]    [address] [place] [address]     1  Tendinitis of right rotator cuff  -     MRI shoulder right wo contrast; Future; Expected date: 06/13/2022    2  Primary osteoarthritis of both knees    3  Chronic right shoulder pain    4  Internal derangement of right shoulder    5  Subacromial impingement, right        77 y o  female with right shoulder calcific tendonitis and improved bilateral knee patellofemoral arthritis  Previous xrays once again reviewed in the office today  At this point despite physical therapy and HEP patient has continued pain in right shoulder with weakness and positive special testing  Will obtain MRI of right shoulder to evaluate for possible rotator cuff tear  Maintain HEP, oral anti inflammatories for pain  See patient back to review MRI of right shoulder to determine future course of treatment  History of the Present Illness   Tim Hernandez is a 77 y o  female in to follow up for calcific tendonitis right shoulder and bilateral knee osteoarthritis  Declined CSI at last appt for both area's  She states her knee's are better  She continues with pain and difficulty with range of motion and overhead lifting  Admits to nighttime awakenings that have persisted through duration of pain since November  She states she is maintaining her HEP as shown by physical therapy  Denies any numbness or tingling in arm  Review of Systems     Review of Systems   Constitutional: Negative for chills and fever  HENT: Negative for ear pain and sore throat  Eyes: Negative for pain and visual disturbance  Respiratory: Negative for cough and shortness of breath  Cardiovascular: Negative for chest pain and palpitations  Gastrointestinal: Negative for abdominal pain and vomiting  Genitourinary: Negative for dysuria and hematuria  Musculoskeletal: Negative for arthralgias and back pain  Skin: Negative for color change and rash     Neurological: Negative for seizures and syncope  All other systems reviewed and are negative  Physical Exam     /80   Pulse 68   Ht 5' 4" (1 626 m)   Wt 82 2 kg (181 lb 3 2 oz)   BMI 31 10 kg/m²     Right shoulder   Active range of motion   140 degrees forward flexion  140 degrees abduction  60 degrees external rotation   Lower thoracic internal rotation       Passive range of motion   170 degrees of forward flexion   There is no tenderness over greater tuberosity   There is 4/5 strength with external rotation testing at the side  Empty can testing elicits mild pain with subtle weakness  Belly press test is negative  James test is positive  [place]'s test is positive   Speed's test is Positive  The patient is neurovascularly intact distally in the extremity         Data Review     I have personally reviewed pertinent films in PACS, and my interpretation follows  Previous shoulder xrays once again reviewed display no fracture dislocation  Glenohumeral joint space is well maintained  Ossification is lateral to the acromion present      Social History     Tobacco Use    Smoking status: Never Smoker    Smokeless tobacco: Never Used   Vaping Use    Vaping Use: Never used   Substance Use Topics    Alcohol use: Never    Drug use: Never           Procedures    Jan Mckay,

## 2022-06-17 ENCOUNTER — OFFICE VISIT (OUTPATIENT)
Dept: INTERNAL MEDICINE CLINIC | Facility: CLINIC | Age: 67
End: 2022-06-17
Payer: COMMERCIAL

## 2022-06-17 VITALS
WEIGHT: 183.4 LBS | BODY MASS INDEX: 31.31 KG/M2 | TEMPERATURE: 97.7 F | HEIGHT: 64 IN | OXYGEN SATURATION: 99 % | RESPIRATION RATE: 16 BRPM | SYSTOLIC BLOOD PRESSURE: 106 MMHG | DIASTOLIC BLOOD PRESSURE: 78 MMHG | HEART RATE: 80 BPM

## 2022-06-17 DIAGNOSIS — Z12.12 SCREENING FOR COLORECTAL CANCER: ICD-10-CM

## 2022-06-17 DIAGNOSIS — R22.1 LOCALIZED SWELLING, MASS AND LUMP, NECK: Primary | ICD-10-CM

## 2022-06-17 DIAGNOSIS — Z12.11 SCREENING FOR COLORECTAL CANCER: ICD-10-CM

## 2022-06-17 PROCEDURE — 99213 OFFICE O/P EST LOW 20 MIN: CPT | Performed by: INTERNAL MEDICINE

## 2022-06-17 NOTE — PROGRESS NOTES
INTERNAL MEDICINE FOLLOW-UP OFFICE VISIT  [facility]    NAME: Giorgi Gerardo  AGE: 77 y o  SEX: female  : 1955   MRN: [id number]    DATE: 2022  TIME: 4:09 PM    Assessment and Plan     Diagnoses and all orders for this visit:    Localized swelling, mass and lump, neck  -     US head neck soft tissue; Future    Screening for colorectal cancer  -     Ambulatory referral for Colonoscopy; Future        - Counseling Documentation: patient was counseled regarding: instructions for management, risk factor reductions, prognosis, patient and family education, risks and benefits of treatment options and importance of compliance with treatment  - Medication Side Effects: Adverse side effects of medications were reviewed with the patient/guardian today  Return to office in:  As needed    Chief Complaint     Chief Complaint   Patient presents with    Throat Pain        History of Present Illness     HPI   has recurrent pain and swelling of the left side of the neck off and on  It is also associated with soreness in the throat  The following portions of the patient's history were reviewed and updated as appropriate: allergies, current medications, past family history, past medical history, past social history, past surgical history and problem list     Review of Systems     Review of Systems   Constitutional: Negative for chills, diaphoresis, fatigue and fever  HENT: Negative for congestion, ear discharge, ear pain, hearing loss, postnasal drip, rhinorrhea, sinus pressure, sinus pain, sneezing, sore throat and voice change  Eyes: Negative for pain, discharge, redness and visual disturbance  Respiratory: Negative for cough, chest tightness, shortness of breath and wheezing  Cardiovascular: Negative for chest pain, palpitations and leg swelling     Gastrointestinal: Negative for abdominal distention, abdominal pain, blood in stool, constipation, diarrhea, nausea and vomiting  Endocrine: Negative for cold intolerance, heat intolerance, polydipsia, polyphagia and polyuria  Genitourinary: Negative for dysuria, flank pain, frequency, hematuria and urgency  Musculoskeletal: Positive for neck pain  Negative for arthralgias, back pain, gait problem, joint swelling, myalgias and neck stiffness  Skin: Negative for rash  Neurological: Negative for dizziness, tremors, syncope, facial asymmetry, speech difficulty, weakness, light-headedness, numbness and headaches  Hematological: Does not bruise/bleed easily  Psychiatric/Behavioral: Negative for behavioral problems, confusion and sleep disturbance  The patient is not nervous/anxious  Active Problem List     Patient Active Problem List   Diagnosis    GERD (gastroesophageal reflux disease)    Generalized abdominal pain    Slow transit constipation    Mantoux: positive    Microscopic hematuria    Chronic midline low back pain without sciatica    Obesity (BMI 30-39  9)    Bilateral edema of lower extremity    Neck pain    Chronic right shoulder pain       Objective     /78 (BP Location: Left arm, Patient Position: Sitting, Cuff Size: Large)   Pulse 80   Temp 97 7 °F (36 5 °C) (Tympanic)   Resp 16   Ht 5' 4" (1 626 m)   Wt 83 2 kg (183 lb 6 4 oz)   SpO2 99%   BMI 31 48 kg/m²     Physical Exam  Constitutional:       General: She is not in acute distress  Appearance: She is well-developed  She is not diaphoretic  HENT:      Head: Normocephalic and atraumatic  Right Ear: External ear normal       Left Ear: External ear normal       Nose: Nose normal    Eyes:      General: No scleral icterus  Right eye: No discharge  Left eye: No discharge  Conjunctiva/sclera: Conjunctivae normal    Neck:      Thyroid: No thyromegaly  Vascular: No JVD  Trachea: No tracheal deviation  Cardiovascular:      Rate and Rhythm: Normal rate and regular rhythm        Heart sounds: Normal heart sounds  No murmur heard  No friction rub  No gallop  Pulmonary:      Effort: Pulmonary effort is normal  No respiratory distress  Breath sounds: Normal breath sounds  No wheezing or rales  Chest:      Chest wall: No tenderness  Abdominal:      General: Bowel sounds are normal  There is no distension  Palpations: Abdomen is soft  Tenderness: There is no abdominal tenderness  There is no guarding or rebound  Musculoskeletal:         General: No tenderness  Normal range of motion  Cervical back: Normal range of motion and neck supple  Lymphadenopathy:      Cervical: No cervical adenopathy  Skin:     General: Skin is warm and dry  Findings: No erythema or rash  Neurological:      Mental Status: She is alert and oriented to person, place, and time  Cranial Nerves: No cranial nerve deficit  Motor: No abnormal muscle tone        Coordination: Coordination normal    Psychiatric:         Judgment: Judgment normal              Current Medications       Current Outpatient Medications:     nystatin (MYCOSTATIN) 500,000 units/5 mL suspension, Apply 5 mL (500,000 Units total) to the mouth or throat 4 (four) times a day (Patient taking differently: Apply 500,000 Units to the mouth or throat as needed), Disp: 473 mL, Rfl: 3    pantoprazole (PROTONIX) 40 mg tablet, Take 1 tablet (40 mg total) by mouth daily, Disp: 60 tablet, Rfl: 2    bacitracin ointment, Apply topically 2 (two) times a day, Disp: , Rfl:     Health Maintenance     Health Maintenance   Topic Date Due    DXA SCAN  Never done    Annual Physical  Never done    DTaP,Tdap,and Td Vaccines (1 - Tdap) Never done    Breast Cancer Screening: Mammogram  Never done    Colorectal Cancer Screening  Never done    Osteoporosis Screening  Never done    Pneumococcal Vaccine: 65+ Years (1 - PCV) Never done    COVID-19 Vaccine (3 - Booster for Biggs Peter series) 03/27/2022    BMI: Followup Plan  04/07/2022    Influenza Vaccine (Season Ended) 09/01/2022    Fall Risk  06/17/2023    Depression Screening  06/17/2023    BMI: Adult  06/17/2023    Hepatitis C Screening  Completed    HIB Vaccine  Aged Out    Hepatitis B Vaccine  Aged Out    IPV Vaccine  Aged Out    Hepatitis A Vaccine  Aged Out    Meningococcal ACWY Vaccine  Aged Out    HPV Vaccine  Aged Out     Immunization History   Administered Date(s) Administered    COVID-19 PFIZER VACCINE 0 3 ML IM 01/25/2021, 10/27/2021         Baljinder Miller MD  [address] AT [facility]

## 2022-06-21 ENCOUNTER — TELEPHONE (OUTPATIENT)
Dept: OBGYN CLINIC | Facility: CLINIC | Age: 67
End: 2022-06-21

## 2022-06-21 NOTE — TELEPHONE ENCOUNTER
I Dr Lexi Araiza,  Pt insurance has denied her MRI because of no P T and no conservative treatment  Please call for a peer to peer review  [phone]  Member ID#: [id number]    Thanks  Delorise Poster

## 2022-10-20 ENCOUNTER — HOSPITAL ENCOUNTER (OUTPATIENT)
Dept: MRI IMAGING | Facility: HOSPITAL | Age: 67
End: 2022-10-20
Attending: ORTHOPAEDIC SURGERY
Payer: MEDICARE

## 2022-10-20 DIAGNOSIS — M75.81 TENDINITIS OF RIGHT ROTATOR CUFF: ICD-10-CM

## 2022-10-20 PROCEDURE — 73221 MRI JOINT UPR EXTREM W/O DYE: CPT

## 2022-10-20 PROCEDURE — G1004 CDSM NDSC: HCPCS

## 2022-10-25 ENCOUNTER — OFFICE VISIT (OUTPATIENT)
Dept: GASTROENTEROLOGY | Facility: CLINIC | Age: 67
End: 2022-10-25
Payer: MEDICARE

## 2022-10-25 VITALS
BODY MASS INDEX: 31.07 KG/M2 | SYSTOLIC BLOOD PRESSURE: 122 MMHG | WEIGHT: 182 LBS | HEIGHT: 64 IN | HEART RATE: 91 BPM | OXYGEN SATURATION: 97 % | DIASTOLIC BLOOD PRESSURE: 80 MMHG

## 2022-10-25 DIAGNOSIS — Z12.11 SCREENING FOR COLORECTAL CANCER: ICD-10-CM

## 2022-10-25 DIAGNOSIS — R10.30 LOWER ABDOMINAL PAIN: ICD-10-CM

## 2022-10-25 DIAGNOSIS — R19.7 DIARRHEA, UNSPECIFIED TYPE: Primary | ICD-10-CM

## 2022-10-25 DIAGNOSIS — Z12.12 SCREENING FOR COLORECTAL CANCER: ICD-10-CM

## 2022-10-25 DIAGNOSIS — K62.5 RECTAL BLEEDING: ICD-10-CM

## 2022-10-25 PROCEDURE — 99204 OFFICE O/P NEW MOD 45 MIN: CPT | Performed by: INTERNAL MEDICINE

## 2022-10-25 NOTE — PATIENT INSTRUCTIONS
Scheduled date of colonoscopy (as of today): 12/21/22  Physician performing colonoscopy: Billy  Location of colonoscopy: [facility]  Bowel prep reviewed with patient: Miralax  Instructions reviewed with patient by: Yuval SOTO  Clearances:

## 2022-10-25 NOTE — PROGRESS NOTES
[facility]    Dear Shruthi Ho,     I had the pleasure of seeing your patient Walt Fothergill in the office today and I thank you for this kind referral        Chief Complaint:  Diarrhea      HPI:  Walt Fothergill is a 79 y o  female who presents with recent episode of significant diarrhea  She was originally referred for screening colonoscopy  The patient apparently had a colonoscopy in 2010 because of a C diff infection  She is a   At that time the colonoscopy was negative  She has not had any since then  Recently she had an episode of severe diarrhea  This was apparently self-limited as her bowel movements have somewhat return to normal   However she has been having lower abdominal cramping pain  Interestingly the patient was told to avoid beans and corn  While she was avoiding these she did not have any discomfort but since she started eating beans again she has had the lower abdominal cramping  She has occasional nocturnal symptomatology  She takes eli tea for this which appears to help  She has also seen bright red blood in the bowl and on the stool and toilet paper on occasion  She has no weight loss fever or chills  She has no other GI symptomatology  She has a history of GERD but this is under good control  She has no upper GI symptomatology  She has no other GI complaints at the present time  She denies any chest pain or shortness of breath  She has no dizziness         Review of Systems:   Constitutional: No fever or chills, feels well, no tiredness, no recent weight gain or weight loss  HENT: No complaints of earache, no hearing loss, no nosebleeds, no nasal discharge, no sore throat, no hoarseness  Eyes: No complaints of eye pain, no red eyes, no discharge from eyes, no itchy eyes  Cardiovascular: No complaints of slow heart rate, no fast heart rate, no chest pain, no palpitations, no leg claudication, no lower extremity edema  Respiratory: No complaints of shortness of breath, no wheezing, no cough, no SOB on exertion, no orthopnea  Gastrointestinal: As noted in HPI  Genitourinary: No complaints of dysuria, no incontinence, no hesitancy, no nocturia  Musculoskeletal:  Positive right shoulder, arm, low back pain  Neurological: No complaints of headache, no confusion, no convulsions, no numbness or tingling, no dizziness or fainting, no limb weakness, no difficulty walking  Skin: No complaints of skin rash or skin lesions, no itching, no skin wound, no dry skin  Hematological/Lymphatic: No complaints of swollen glands, does not bleed easy  Allergic/Immunologic: No immunocompromised state  Endocrine:  No complaints of polyuria, no polydipsia  Psychiatric/Behavioral: is not suicidal, no sleep disturbances, no anxiety or depression, no change in personality, no emotional problems  Historical Information   Past Medical History:   Diagnosis Date   • GERD (gastroesophageal reflux disease)      Past Surgical History:   Procedure Laterality Date   • TUBAL LIGATION       Social History   Social History     Substance and Sexual Activity   Alcohol Use Never     Social History     Substance and Sexual Activity   Drug Use Never     Social History     Tobacco Use   Smoking Status Never Smoker   Smokeless Tobacco Never Used     Family History   Problem Relation Age of Onset   • Diabetes Mother    • Kidney disease Mother    • Stroke Father          Current Medications: has a current medication list which includes the following prescription(s): nystatin, pantoprazole, and bacitracin  Vital Signs: /80   Pulse 91   Ht 5' 4" (1 626 m)   Wt 82 6 kg (182 lb)   SpO2 97%   BMI 31 24 kg/m²     Physical Exam:   Constitutional  General Appearance: No acute distress, well appearing and well nourished  Head  Normocephalic  Eyes  Conjunctivae and lids: No swelling, erythema, or discharge      Pupils and irises: Equal, round and reactive to light  Ears, Nose, Mouth, and Throat  External inspection of ears and nose: Normal  Nasal mucosa, septum and turbinates: Normal without edema or erythema/   Oropharynx: Normal with no erythema, edema, exudate or lesions  Neck  Normal range of motion  Neck supple  Cardiovascular  Auscultation of the heart: Normal rate and rhythm, normal S1 and S2 without murmurs  Examination of the extremities for edema and/or varicosities: Normal  Pulmonary/Chest  Respiratory effort: No increased work of breathing or signs of respiratory distress  Auscultation of lungs: Clear to auscultation, equal breath sounds bilaterally, no wheezes, rales, no rhonchi  Abdomen  Abdomen: Non-tender, no masses  Liver and spleen: No hepatomegaly or splenomegaly  Musculoskeletal  Gait and station: normal   Digits and Nails: normal without clubbing or cyanosis  Inspection/palpation of joints, bones, and muscles: Normal  Neurological  No nystagmus or asterixis  Skin  Skin and subcutaneous tissue: Normal without rashes or lesions  Lymphatic  Palpation of the lymph nodes in neck: No lymphadenopathy  Psychiatric  Orientation to person, place and time: Normal   Mood and affect: Normal          Labs:   Lab Results   Component Value Date    ALT 27 05/15/2021    AST 11 05/15/2021    BUN 13 05/15/2021    CALCIUM 10 1 05/15/2021     (H) 05/15/2021    CO2 28 05/15/2021    CREATININE 0 74 05/15/2021    HDL 66 05/15/2021    HCT 45 3 05/15/2021    HGB 13 7 05/15/2021     05/15/2021    K 4 5 05/15/2021    TRIG 84 05/15/2021    WBC 6 12 05/15/2021         X-Rays & Procedures:   No orders to display         ______________________________________________________________________      Assessment & Plan:      Diagnoses and all orders for this visit:    Diarrhea, unspecified type  -     Colonoscopy;  Future    Screening for colorectal cancer  -     Ambulatory referral for Colonoscopy    Rectal bleeding  -     Colonoscopy; Future    Lower abdominal pain  -     Colonoscopy; Future      I have taken liberty of scheduling the patient for colonoscopy  She will try to avoid those foods which cause of the lower abdominal pain, specifically beans and corn  Will be happy to inform you of her results and further recommendations  I would like to thank you for allowing me to participate in her care                With warmest regards,    Larry Hamman, MD, [facility]

## 2022-10-25 NOTE — LETTER
October 25, 2022     David Kelly MD  [address] [place] [address]    Patient: Alfreda Larry   YOB: 1955   Date of Visit: 10/25/2022       Dear Dr Juhi Escamilla:    Thank you for referring Alfreda Larry to me for evaluation  Below are my notes for this consultation  If you have questions, please do not hesitate to call me  I look forward to following your patient along with you  Sincerely,        Girma Sanchez MD        CC: No Recipients  Girma Sanchez MD  10/25/2022  4:11 PM  Incomplete  [facility]    Dear Luis Grimm,     I had the pleasure of seeing your patient Alfreda Larry in the office today and I thank you for this kind referral        Chief Complaint:  Diarrhea      HPI:  Alfreda Larry is a 79 y o  female who presents with recent episode of significant diarrhea  She was originally referred for screening colonoscopy  The patient apparently had a colonoscopy in 2010 because of a C diff infection  She is a   At that time the colonoscopy was negative  She has not had any since then  Recently she had an episode of severe diarrhea  This was apparently self-limited as her bowel movements have somewhat return to normal   However she has been having lower abdominal cramping pain  Interestingly the patient was told to avoid beans and corn  While she was avoiding these she did not have any discomfort but since she started eating beans again she has had the lower abdominal cramping  She has occasional nocturnal symptomatology  She takes eli tea for this which appears to help  She has also seen bright red blood in the bowl and on the stool and toilet paper on occasion  She has no weight loss fever or chills  She has no other GI symptomatology  She has a history of GERD but this is under good control  She has no upper GI symptomatology  She has no other GI complaints at the present time    She denies any chest pain or shortness of breath  She has no dizziness         Review of Systems:   Constitutional: No fever or chills, feels well, no tiredness, no recent weight gain or weight loss  HENT: No complaints of earache, no hearing loss, no nosebleeds, no nasal discharge, no sore throat, no hoarseness  Eyes: No complaints of eye pain, no red eyes, no discharge from eyes, no itchy eyes  Cardiovascular: No complaints of slow heart rate, no fast heart rate, no chest pain, no palpitations, no leg claudication, no lower extremity edema  Respiratory: No complaints of shortness of breath, no wheezing, no cough, no SOB on exertion, no orthopnea  Gastrointestinal: As noted in HPI  Genitourinary: No complaints of dysuria, no incontinence, no hesitancy, no nocturia  Musculoskeletal:  Positive right shoulder, arm, low back pain  Neurological: No complaints of headache, no confusion, no convulsions, no numbness or tingling, no dizziness or fainting, no limb weakness, no difficulty walking  Skin: No complaints of skin rash or skin lesions, no itching, no skin wound, no dry skin  Hematological/Lymphatic: No complaints of swollen glands, does not bleed easy  Allergic/Immunologic: No immunocompromised state  Endocrine:  No complaints of polyuria, no polydipsia  Psychiatric/Behavioral: is not suicidal, no sleep disturbances, no anxiety or depression, no change in personality, no emotional problems         Historical Information   Past Medical History:   Diagnosis Date   • GERD (gastroesophageal reflux disease)      Past Surgical History:   Procedure Laterality Date   • TUBAL LIGATION       Social History   Social History     Substance and Sexual Activity   Alcohol Use Never     Social History     Substance and Sexual Activity   Drug Use Never     Social History     Tobacco Use   Smoking Status Never Smoker   Smokeless Tobacco Never Used     Family History   Problem Relation Age of Onset   • Diabetes Mother    • Kidney disease Mother    • Stroke Father          Current Medications: has a current medication list which includes the following prescription(s): nystatin, pantoprazole, and bacitracin  Vital Signs: /80   Pulse 91   Ht 5' 4" (1 626 m)   Wt 82 6 kg (182 lb)   SpO2 97%   BMI 31 24 kg/m²     Physical Exam:   Constitutional  General Appearance: No acute distress, well appearing and well nourished  Head  Normocephalic  Eyes  Conjunctivae and lids: No swelling, erythema, or discharge  Pupils and irises: Equal, round and reactive to light  Ears, Nose, Mouth, and Throat  External inspection of ears and nose: Normal  Nasal mucosa, septum and turbinates: Normal without edema or erythema/   Oropharynx: Normal with no erythema, edema, exudate or lesions  Neck  Normal range of motion  Neck supple  Cardiovascular  Auscultation of the heart: Normal rate and rhythm, normal S1 and S2 without murmurs  Examination of the extremities for edema and/or varicosities: Normal  Pulmonary/Chest  Respiratory effort: No increased work of breathing or signs of respiratory distress  Auscultation of lungs: Clear to auscultation, equal breath sounds bilaterally, no wheezes, rales, no rhonchi  Abdomen  Abdomen: Non-tender, no masses  Liver and spleen: No hepatomegaly or splenomegaly  Musculoskeletal  Gait and station: normal   Digits and Nails: normal without clubbing or cyanosis  Inspection/palpation of joints, bones, and muscles: Normal  Neurological  No nystagmus or asterixis  Skin  Skin and subcutaneous tissue: Normal without rashes or lesions  Lymphatic  Palpation of the lymph nodes in neck: No lymphadenopathy     Psychiatric  Orientation to person, place and time: Normal   Mood and affect: Normal          Labs:   Lab Results   Component Value Date    ALT 27 05/15/2021    AST 11 05/15/2021    BUN 13 05/15/2021    CALCIUM 10 1 05/15/2021     (H) 05/15/2021    CO2 28 05/15/2021    CREATININE 0 74 05/15/2021    HDL 66 05/15/2021    HCT 45 3 05/15/2021    HGB 13 7 05/15/2021     05/15/2021    K 4 5 05/15/2021    TRIG 84 05/15/2021    WBC 6 12 05/15/2021         X-Rays & Procedures:   No orders to display         ______________________________________________________________________      Assessment & Plan:      Diagnoses and all orders for this visit:    Diarrhea, unspecified type  -     Colonoscopy; Future    Screening for colorectal cancer  -     Ambulatory referral for Colonoscopy    Rectal bleeding  -     Colonoscopy; Future    Lower abdominal pain  -     Colonoscopy; Future      I have taken liberty of scheduling the patient for colonoscopy  She will try to avoid those foods which cause of the lower abdominal pain, specifically beans and corn  Will be happy to inform you of her results and further recommendations  I would like to thank you for allowing me to participate in her care                With warmest regards,    Emilie Duenas MD, [facility]

## 2022-11-07 ENCOUNTER — OFFICE VISIT (OUTPATIENT)
Dept: OBGYN CLINIC | Facility: CLINIC | Age: 67
End: 2022-11-07

## 2022-11-07 VITALS
DIASTOLIC BLOOD PRESSURE: 85 MMHG | HEART RATE: 93 BPM | WEIGHT: 184.4 LBS | SYSTOLIC BLOOD PRESSURE: 124 MMHG | HEIGHT: 64 IN | BODY MASS INDEX: 31.48 KG/M2

## 2022-11-07 DIAGNOSIS — M75.02 ADHESIVE CAPSULITIS OF LEFT SHOULDER: ICD-10-CM

## 2022-11-07 DIAGNOSIS — G89.29 CHRONIC RIGHT SHOULDER PAIN: ICD-10-CM

## 2022-11-07 DIAGNOSIS — M75.101 TEAR OF RIGHT ROTATOR CUFF, UNSPECIFIED TEAR EXTENT, UNSPECIFIED WHETHER TRAUMATIC: Primary | ICD-10-CM

## 2022-11-07 DIAGNOSIS — M25.511 CHRONIC RIGHT SHOULDER PAIN: ICD-10-CM

## 2022-11-07 NOTE — PROGRESS NOTES
Patient Name:  Shelly Lopez  MRN:  [id number]    [address] [place] [address]     1  Tear of right rotator cuff, unspecified tear extent, unspecified whether traumatic  -     Ambulatory Referral to Physical Therapy; Future    2  Adhesive capsulitis of left shoulder    3  Chronic right shoulder pain        79 y o  female with Right shoulder rotator cuff tear  MRI was reviewed in the office displaying full-thickness tear of anterior supraspinatus tendon  Discussed in detail various treatment options including both operative and non-operative treatments  Operative treatment would include an arthroscopic repair of the rotator cuff  Non-operative treatments may include physical therapy, injection therapies, activity modification, and OTC analgesics for pain  Risks and benefits of treatment options reviewed in detail today  Patient is currently developing a component of adhesive capsulitis as evidence by her decreased passive range of motion the office today  At this time, I recommend that she complete complete formal physical therapy to improver her range of motion  I would her to make improvements in her range of motion prior to pursuing surgical intervention  Patient was also educated today on a few home exercises she can complete in addition to physical therapy  I would like to see the patient back in approximately 6 weeks for re-evaluation and consideration for surgical intervention if passive range of motion is improved with continued symptomatic rotator cuff tear  All questions were addressed at today's appointment  History of the Present Illness   Shelly Lopez is a 79 y o  female with Right shoulder pain  Patient is RHD  He reports feeling worse since her last appointment  She notes her pain radiates down her arm  She also has increased pain with lifting, reaching overhead, reaching behind  Patient reports trouble sleeping due to increased pain at night    Currently is not taking any OTC medication for her pain  She is here today to review the results of her MRI to discuss next steps regarding treatment options  She denies any numbness or tingling  Review of Systems     Review of Systems   Constitutional: Positive for activity change  Negative for chills, fever and unexpected weight change  HENT: Negative for hearing loss, nosebleeds and sore throat  Eyes: Negative for pain, redness and visual disturbance  Respiratory: Negative for cough, shortness of breath and wheezing  Cardiovascular: Negative for chest pain, palpitations and leg swelling  Gastrointestinal: Negative for abdominal pain, nausea and vomiting  Endocrine: Negative for polydipsia and polyuria  Genitourinary: Negative for dysuria and hematuria  Musculoskeletal: Positive for myalgias  See HPI   Skin: Negative for rash and wound  Neurological: Negative for dizziness, numbness and headaches  Psychiatric/Behavioral: Negative for decreased concentration and suicidal ideas  The patient is not nervous/anxious  Physical Exam     /85   Pulse 93   Ht 5' 4" (1 626 m)   Wt 83 6 kg (184 lb 6 4 oz)   BMI 31 65 kg/m²     Right Shoulder: Active range of motion   100 degrees forward flexion  90 degrees abduction  50 degrees external rotation   SI joint internal rotation    Passive range of motion   130 degrees of forward flexion     There is  tenderness present over the greater tuberosity and biceps tendon  There is 4/5 strength with external rotation testing at the side  Empty can testing is positive   Belly press test is negative  James test is negative   [place]'s test is negative    Speed's test is Negative    The patient is neurovascularly intact distally in the extremity  Data Review     I have personally reviewed pertinent films in PACS, and my interpretation follows      MRI of the right shoulder taken on 10/20/2022 demonstrates a small full-thickness tear of the anterior supraspinatus without retraction or atrophy  Mild tenosynovitis of the long head of the biceps tendon  Small tear of the superior glenoid labrum       Social History     Tobacco Use   • Smoking status: Never Smoker   • Smokeless tobacco: Never Used   Vaping Use   • Vaping Use: Never used   Substance Use Topics   • Alcohol use: Never   • Drug use: Never           Procedures  None     Bridget Salmeron DO   Scribe Attestation    I,:  Elba Hahn am acting as a scribe while in the presence of the attending physician :       I,:  Bridget Salmeron DO personally performed the services described in this documentation    as scribed in my presence :

## 2022-11-16 ENCOUNTER — TELEPHONE (OUTPATIENT)
Dept: GASTROENTEROLOGY | Facility: CLINIC | Age: 67
End: 2022-11-16

## 2022-11-16 NOTE — TELEPHONE ENCOUNTER
Patients GI provider:  Dr Wyatt Arnold    Number to return call: [phone]    Reason for call: Pt calling to Cancel her colonoscopy and would like to receive a cologuard test instead        Scheduled procedure/appointment date if applicable: Apt/procedure 12/21/22

## 2022-11-16 NOTE — TELEPHONE ENCOUNTER
Spoke to ptn, she does not want the colonoscopy and wants a cologuard  ptn will call her PCP office for the cologuard   Colonoscopy was cx'd

## 2022-11-17 ENCOUNTER — TELEPHONE (OUTPATIENT)
Dept: INTERNAL MEDICINE CLINIC | Facility: CLINIC | Age: 67
End: 2022-11-17

## 2022-11-17 DIAGNOSIS — Z12.11 SCREEN FOR COLON CANCER: Primary | ICD-10-CM

## 2022-11-17 NOTE — TELEPHONE ENCOUNTER
Patient would like to have an order for a Cologuard kit rather than the regular colonoscopy  Advise patient

## 2022-11-23 ENCOUNTER — EVALUATION (OUTPATIENT)
Dept: PHYSICAL THERAPY | Facility: CLINIC | Age: 67
End: 2022-11-23

## 2022-11-23 DIAGNOSIS — M75.101 TEAR OF RIGHT ROTATOR CUFF, UNSPECIFIED TEAR EXTENT, UNSPECIFIED WHETHER TRAUMATIC: ICD-10-CM

## 2022-11-23 NOTE — PROGRESS NOTES
PT Evaluation     Today's date: 2022  Patient name: Fede Carter  : 1955  MRN: [id number]  Referring provider: Mynor Shaffer DO  Dx:   Encounter Diagnosis     ICD-10-CM    1  Tear of right rotator cuff, unspecified tear extent, unspecified whether traumatic  M75 101 Ambulatory Referral to Physical Therapy                     Assessment  Assessment details: Fede Carter is a 79 y o  female presenting as an outpatient to [place] [address] PT w/ dx of R rotator cuff tear  Pt presents w/ pain, impaired posture, hypertonicity of surrounding musculature, decreased ROM, and decreased strength significantly limiting pt's functional ability  Pt will benefit from skilled PT services to address the above deficits in order to max function to allow pt to achieve goals in PT  Thank you for the referral of this pt  Impairments: abnormal muscle tone, abnormal or restricted ROM, activity intolerance, impaired physical strength, lacks appropriate home exercise program, pain with function and poor posture   Understanding of Dx/Px/POC: good   Prognosis: good    Goals  ST  Pain decreased by 25% in 4-6 weeks  2  ROM increased by 25% in 4-6 weeks  3  Strength increased by 1/2 to 1 muscle grade in all deficient muscle groups in 4-6 weeks  LT  Decrease pain to 1-2/10 at worst by d/c   2  Increase ROM to [facility] for all deficient movements by d/c   3  IADL performance increased to max function by d/c   4  Recreational performance increased to max function by d/c        Plan  Planned modality interventions: cryotherapy, TENS and thermotherapy: hydrocollator packs  Other planned modality interventions: other modalities PRN  Planned therapy interventions: ADL retraining, IADL retraining, flexibility, functional ROM exercises, graded exercise, home exercise program, manual therapy, joint mobilization, neuromuscular re-education, patient education, postural training, strengthening, therapeutic exercise and therapeutic activities  Other planned therapy interventions: other interventions PRN  Frequency: 1-2x/week  Duration in weeks: 4  Plan of Care beginning date: 2022  Plan of Care expiration date: 2022  Treatment plan discussed with: patient        Subjective Evaluation    History of Present Illness  Mechanism of injury: Pt reports to IE w/ c/o R shoulder pain and dx of RTC tear  Pt reports that pain began approx  6 mos ago of insidious onset and worsened w/ fall at work approx 2 mos ago  She reports pain intermittently travels into lateral RUE to hand  Pt reports c/s recently started to become painful - she feels it may be due to pillow  Pt reports intermittent parasthesias in lateral RUE  Pt denies any shoulder issues previously  Although not yet scheduled, pt reports planning to have surgery in 2023  Pain  Current pain ratin  At worst pain rating: 10  Location: R shoulder  Relieving factors: medications and heat (OTC Tylenol)  Exacerbated by: OH elevation, reaching out to side, functional IR, functional ER, sleeping at night  Social Support  Lives with: spouse    Employment status: working (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)  Hand dominance: right      Diagnostic Tests  Abnormal x-ray: : No acute osseous abnormality  Abnormal MRI: 10/20: Full-thickness anterior leading edge tear of the supraspinatus, spanning 1 cm anterior to posterior ; Mild long head of biceps tenosynovitis without tear; small superior labral tear    Patient Goals  Patient goals for therapy: decreased pain, increased motion and increased strength          Objective     Active Range of Motion     Right Shoulder   Flexion: 75 degrees with pain  Abduction: 75 degrees with pain  External rotation 45°: 18 degrees with pain  Internal rotation 45°: 46 degrees with pain    Additional Active Range of Motion Details  Gross c/s ROM (L/R):   flexion: minimal *min LUT pain  extension: moderate * LUT pain  rotation: minimal *Pain/WFL  lateral flexion: minimal restriction *LUT pain; *minimal restriction *LUT pain    Strength/Myotome Testing     Right Shoulder     Planes of Motion   Flexion: 4- (*pain)   Abduction: 4- (*pain)   External rotation at 45°: 4- (*Pain)   Internal rotation at 45°: 4- (*pain)     General Comments:      Shoulder Comments   Observation/Posture:  Pt sits w/ fwd, rounded shoulders w/ fwd head    Palpation: Hypertonicity/Tenderness w/ palpation to LUT; tenderness w/ palpation to R biceps musculature; tenderness w/ palpation to lateral worse vs posterior worse vs anterior shoulder joint    c/s special tests:   sharp-deborah: negative  vertebral artery: negative  Spurlings:negative  Isiah's: positive pain/restriction b/l     Shoulder Special Tests:   Debbi Lager (R): positive  Empty Can (R):positive  Neer Impingement (R): positive  Speed's Test (R) positive          Daily Treatment Diary    EPOC: 12/21/22  Precautions: None  Co- Morbidities:   Patient Active Problem List   Diagnosis   • GERD (gastroesophageal reflux disease)   • Generalized abdominal pain   • Slow transit constipation   • Mantoux: positive   • Microscopic hematuria   • Chronic midline low back pain without sciatica   • Obesity (BMI 30-39  9)   • Bilateral edema of lower extremity   • Neck pain   • Chronic right shoulder pain         Manual  11/23          R GHJ mobs        b/l 1st rib MWM            Gentle TPR/STM to UT                       Total Time            Exercise Diary 11/23         THEREX       Pt ed 8' HEP handout      R shoulder PROM       Pendulums   m/l &a/p           UT stretch           Supine AA sh flexion           Supine AA sh ER           Table slides- flex/scap       Table slides ER                     Standing cane IR/extension           NEURO RE-ED           Post sh rolls       Scapular squeeze           Mod prone rows            Mod prone ext            TB resisted UT strengthening             LPD/rows                                                                                                              Modalities  11/23         CP  10'

## 2022-11-30 ENCOUNTER — OFFICE VISIT (OUTPATIENT)
Dept: PHYSICAL THERAPY | Facility: CLINIC | Age: 67
End: 2022-11-30

## 2022-11-30 DIAGNOSIS — M75.101 TEAR OF RIGHT ROTATOR CUFF, UNSPECIFIED TEAR EXTENT, UNSPECIFIED WHETHER TRAUMATIC: Primary | ICD-10-CM

## 2022-11-30 NOTE — PROGRESS NOTES
Daily Note     Today's date: 2022  Patient name: Christy Campos  : 1955  MRN: [id number]  Referring provider: Jayson Aldridge DO  Dx:   Encounter Diagnosis     ICD-10-CM    1  Tear of right rotator cuff, unspecified tear extent, unspecified whether traumatic  M75  101                      Subjective: Patient reports her shoulder hurts  Objective: See treatment diary below      Assessment: TEP initiated as charted  Tolerated treatment fair  Sig muscle guarding present with PROM  Patient required extensive verbal cuing and demonstration to facilitate AA movement  Patient demonstrated fatigue post treatment and would benefit from continued PT      Plan: Progress treatment as tolerated  Manual            R GHJ mobs        b/l 1st rib MWM            Gentle TPR/STM to UT                       Total Time            Exercise Diary        THEREX       Pt ed 8' HEP handout      R shoulder PROM  CALVIN  8'     Pendulums   m/l &a/p    10x ea       UT stretch           Supine AA sh flexion   1x p!        Supine AA sh ER   2x5       Table slides- flex/scap  2x10 ea      Table slides ER       Pulley's - ROM   AA 2' flex            Standing cane IR/extension           NEURO RE-ED           Post sh rolls       Scapular squeeze           Mod prone rows            Mod prone ext            TB resisted UT strengthening             LPD/rows                                                                                                              Modalities           CP  10'

## 2022-12-05 ENCOUNTER — OFFICE VISIT (OUTPATIENT)
Dept: PHYSICAL THERAPY | Facility: CLINIC | Age: 67
End: 2022-12-05

## 2022-12-05 DIAGNOSIS — M75.101 TEAR OF RIGHT ROTATOR CUFF, UNSPECIFIED TEAR EXTENT, UNSPECIFIED WHETHER TRAUMATIC: Primary | ICD-10-CM

## 2022-12-05 NOTE — PROGRESS NOTES
Daily Note     Today's date: 2022  Patient name: Raheel Fournier  : 1955  MRN: [id number]  Referring provider: Cheko Andre DO  Dx:   Encounter Diagnosis     ICD-10-CM    1  Tear of right rotator cuff, unspecified tear extent, unspecified whether traumatic  M75  101                      Subjective: Patient noted no new complaints  Objective: See treatment diary below      Assessment: Tolerated treatment fair  Patient demonstrated significant muscle guarding with PROM VC for patient to relax R UE and to not muscle guard with PROM being performed  Patient would benefit from continued PT  Patient trailed 2 reps for shoulder AAROM with cane in supine noting pain with performing supine cane flexion therefore had patient stop after 2 rep today and performed  table slides instead  Added in posterior shoulder rolls and scap squeezes VC to correct patient form with added exercises  Plan: Continue per plan of care  Manual            R GHJ mobs        b/l 1st rib MWM            Gentle TPR/STM to UT                       Total Time            Exercise Diary      THEREX       Pt ed 8' HEP handout      R shoulder PROM  CALVIN  8' AC 10'    Pendulums   m/l &a/p    10x ea  10x ea     UT stretch           Supine AA sh flexion   1x p! 2x to 90 degrees P!      Supine AA sh ER   2x5  3sec x 10     Table slides- flex/scap  2x10 ea  2 x 10    Table slides ER   2 x 10    Pulley's - ROM   AA 2' flex AA 2' flex            Standing cane IR/extension           NEURO RE-ED           Post sh rolls   20 x     Scapular squeeze      10 x 3 sec      Mod prone rows            Mod prone ext            TB resisted UT strengthening             LPD/rows                                                                                                              Modalities           CP  10' Spoke with Dr. Hahn about patient heart rhythm (2nd degree block type 2 per tele).

## 2022-12-07 ENCOUNTER — APPOINTMENT (OUTPATIENT)
Dept: PHYSICAL THERAPY | Facility: CLINIC | Age: 67
End: 2022-12-07

## 2022-12-12 ENCOUNTER — APPOINTMENT (OUTPATIENT)
Dept: PHYSICAL THERAPY | Facility: CLINIC | Age: 67
End: 2022-12-12

## 2022-12-14 ENCOUNTER — APPOINTMENT (OUTPATIENT)
Dept: PHYSICAL THERAPY | Facility: CLINIC | Age: 67
End: 2022-12-14

## 2022-12-16 ENCOUNTER — TELEPHONE (OUTPATIENT)
Dept: INTERNAL MEDICINE CLINIC | Facility: CLINIC | Age: 67
End: 2022-12-16

## 2022-12-16 ENCOUNTER — OFFICE VISIT (OUTPATIENT)
Dept: INTERNAL MEDICINE CLINIC | Facility: CLINIC | Age: 67
End: 2022-12-16

## 2022-12-16 VITALS
TEMPERATURE: 97.5 F | HEART RATE: 80 BPM | RESPIRATION RATE: 18 BRPM | SYSTOLIC BLOOD PRESSURE: 130 MMHG | BODY MASS INDEX: 30.9 KG/M2 | DIASTOLIC BLOOD PRESSURE: 76 MMHG | WEIGHT: 180 LBS | OXYGEN SATURATION: 97 %

## 2022-12-16 DIAGNOSIS — J02.9 VIRAL PHARYNGITIS: Primary | ICD-10-CM

## 2022-12-16 LAB
COLOGUARD RESULT REPORTABLE: NEGATIVE
SARS-COV-2 AG UPPER RESP QL IA: NEGATIVE
VALID CONTROL: NORMAL

## 2022-12-16 RX ORDER — METHYLPREDNISOLONE 4 MG/1
TABLET ORAL
Qty: 21 EACH | Refills: 0 | Status: SHIPPED | OUTPATIENT
Start: 2022-12-16

## 2022-12-16 NOTE — TELEPHONE ENCOUNTER
----- Message from Ariana Levi DO sent at 12/16/2022 11:33 AM EST -----  Please let the patient know their cologuard testing was negative

## 2022-12-16 NOTE — PROGRESS NOTES
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     Review of Systems   Constitutional: Negative for fever  HENT: Positive for postnasal drip and sore throat  Negative for congestion, rhinorrhea, sinus pressure and voice change  Respiratory: Negative for cough and shortness of breath  Current Outpatient Medications on File Prior to Visit   Medication Sig   • nystatin (MYCOSTATIN) 500,000 units/5 mL suspension Apply 5 mL (500,000 Units total) to the mouth or throat 4 (four) times a day   • pantoprazole (PROTONIX) 40 mg tablet Take 1 tablet (40 mg total) by mouth daily   • bacitracin ointment Apply topically 2 (two) times a day       Objective     /76 (BP Location: Left arm, Patient Position: Sitting, Cuff Size: Standard)   Pulse 80   Temp 97 5 °F (36 4 °C) (Temporal)   Resp 18   Wt 81 6 kg (180 lb)   SpO2 97%   BMI 30 90 kg/m²     Physical Exam  HENT:      Head: Normocephalic  Right Ear: Tympanic membrane normal       Left Ear: Tympanic membrane normal       Mouth/Throat:      Mouth: Mucous membranes are dry  Pharynx: Uvula midline  Posterior oropharyngeal erythema present  No oropharyngeal exudate  Tonsils: No tonsillar exudate  1+ on the right  1+ on the left  Eyes:      Conjunctiva/sclera: Conjunctivae normal    Cardiovascular:      Rate and Rhythm: Normal rate and regular rhythm  Heart sounds: No murmur heard    Neurological: Mental Status: She is alert and oriented to person, place, and time         Tri Perdue,

## 2022-12-22 ENCOUNTER — OFFICE VISIT (OUTPATIENT)
Dept: INTERNAL MEDICINE CLINIC | Facility: CLINIC | Age: 67
End: 2022-12-22

## 2022-12-22 VITALS
TEMPERATURE: 97.6 F | OXYGEN SATURATION: 98 % | WEIGHT: 187 LBS | DIASTOLIC BLOOD PRESSURE: 82 MMHG | SYSTOLIC BLOOD PRESSURE: 130 MMHG | BODY MASS INDEX: 31.92 KG/M2 | HEART RATE: 99 BPM | RESPIRATION RATE: 18 BRPM | HEIGHT: 64 IN

## 2022-12-22 DIAGNOSIS — J20.9 ACUTE BRONCHITIS, UNSPECIFIED ORGANISM: Primary | ICD-10-CM

## 2022-12-22 DIAGNOSIS — Z00.00 MEDICARE ANNUAL WELLNESS VISIT, INITIAL: ICD-10-CM

## 2022-12-22 DIAGNOSIS — Z12.31 ENCOUNTER FOR SCREENING MAMMOGRAM FOR BREAST CANCER: ICD-10-CM

## 2022-12-22 RX ORDER — AZITHROMYCIN 250 MG/1
TABLET, FILM COATED ORAL
Qty: 6 TABLET | Refills: 0 | Status: SHIPPED | OUTPATIENT
Start: 2022-12-22 | End: 2022-12-27

## 2022-12-22 RX ORDER — BENZONATATE 100 MG/1
100 CAPSULE ORAL 3 TIMES DAILY PRN
Qty: 30 CAPSULE | Refills: 0 | Status: SHIPPED | OUTPATIENT
Start: 2022-12-22

## 2022-12-22 NOTE — PATIENT INSTRUCTIONS
Medicare Preventive Visit Patient Instructions  Thank you for completing your Welcome to Medicare Visit or Medicare Annual Wellness Visit today  Your next wellness visit will be due in one year (12/23/2023)  The screening/preventive services that you may require over the next 5-10 years are detailed below  Some tests may not apply to you based off risk factors and/or age  Screening tests ordered at today's visit but not completed yet may show as past due  Also, please note that scanned in results may not display below  Preventive Screenings:  Service Recommendations Previous Testing/Comments   Colorectal Cancer Screening  * Colonoscopy    * Fecal Occult Blood Test (FOBT)/Fecal Immunochemical Test (FIT)  * Fecal DNA/Cologuard Test  * Flexible Sigmoidoscopy Age: 39-70 years old   Colonoscopy: every 10 years (may be performed more frequently if at higher risk)  OR  FOBT/FIT: every 1 year  OR  Cologuard: every 3 years  OR  Sigmoidoscopy: every 5 years  Screening may be recommended earlier than age 39 if at higher risk for colorectal cancer  Also, an individualized decision between you and your healthcare provider will decide whether screening between the ages of 74-80 would be appropriate  Colonoscopy: 12/12/2022  FOBT/FIT: Not on file  Cologuard: 12/12/2022  Sigmoidoscopy: Not on file    Screening Current     Breast Cancer Screening Age: 36 years old  Frequency: every 1-2 years  Not required if history of left and right mastectomy Mammogram: Not on file        Cervical Cancer Screening Between the ages of 21-29, pap smear recommended once every 3 years  Between the ages of 33-67, can perform pap smear with HPV co-testing every 5 years     Recommendations may differ for women with a history of total hysterectomy, cervical cancer, or abnormal pap smears in past  Pap Smear: Not on file    Screening Not Indicated   Hepatitis C Screening Once for adults born between 1945 and 1965  More frequently in patients at high risk for Hepatitis C Hep C Antibody: 05/15/2021    Screening Current   Diabetes Screening 1-2 times per year if you're at risk for diabetes or have pre-diabetes Fasting glucose: 94 mg/dL (5/15/2021)  A1C: No results in last 5 years (No results in last 5 years)      Cholesterol Screening Once every 5 years if you don't have a lipid disorder  May order more often based on risk factors  Lipid panel: 05/15/2021    Screening Current     Other Preventive Screenings Covered by Medicare:  1  Abdominal Aortic Aneurysm (AAA) Screening: covered once if your at risk  You're considered to be at risk if you have a family history of AAA  2  Lung Cancer Screening: covers low dose CT scan once per year if you meet all of the following conditions: (1) Age 50-69; (2) No signs or symptoms of lung cancer; (3) Current smoker or have quit smoking within the last 15 years; (4) You have a tobacco smoking history of at least 20 pack years (packs per day multiplied by number of years you smoked); (5) You get a written order from a healthcare provider  3  Glaucoma Screening: covered annually if you're considered high risk: (1) You have diabetes OR (2) Family history of glaucoma OR (3)  aged 48 and older OR (3)  American aged 72 and older  3  Osteoporosis Screening: covered every 2 years if you meet one of the following conditions: (1) You're estrogen deficient and at risk for osteoporosis based off medical history and other findings; (2) Have a vertebral abnormality; (3) On glucocorticoid therapy for more than 3 months; (4) Have primary hyperparathyroidism; (5) On osteoporosis medications and need to assess response to drug therapy  · Last bone density test (DXA Scan): Not on file  5  HIV Screening: covered annually if you're between the age of 12-76  Also covered annually if you are younger than 13 and older than 72 with risk factors for HIV infection   For pregnant patients, it is covered up to 3 times per pregnancy  Immunizations:  Immunization Recommendations   Influenza Vaccine Annual influenza vaccination during flu season is recommended for all persons aged >= 6 months who do not have contraindications   Pneumococcal Vaccine   * Pneumococcal conjugate vaccine = PCV13 (Prevnar 13), PCV15 (Vaxneuvance), PCV20 (Prevnar 20)  * Pneumococcal polysaccharide vaccine = PPSV23 (Pneumovax) Adults 25-60 years old: 1-3 doses may be recommended based on certain risk factors  Adults 72 years old: 1-2 doses may be recommended based off what pneumonia vaccine you previously received   Hepatitis B Vaccine 3 dose series if at intermediate or high risk (ex: diabetes, end stage renal disease, liver disease)   Tetanus (Td) Vaccine - COST NOT COVERED BY MEDICARE PART B Following completion of primary series, a booster dose should be given every 10 years to maintain immunity against tetanus  Td may also be given as tetanus wound prophylaxis  Tdap Vaccine - COST NOT COVERED BY MEDICARE PART B Recommended at least once for all adults  For pregnant patients, recommended with each pregnancy  Shingles Vaccine (Shingrix) - COST NOT COVERED BY MEDICARE PART B  2 shot series recommended in those aged 48 and above     Health Maintenance Due:      Topic Date Due   • DXA SCAN  Never done   • Breast Cancer Screening: Mammogram  Never done   • Colorectal Cancer Screening  12/12/2025   • Hepatitis C Screening  Completed     Immunizations Due:      Topic Date Due   • Hepatitis B Vaccine (1 of 3 - 3-dose series) Never done   • Pneumococcal Vaccine: 65+ Years (1 - PCV) Never done   • COVID-19 Vaccine (3 - Booster for Pfizer series) 12/22/2021   • Influenza Vaccine (1) Never done     Advance Directives   What are advance directives? Advance directives are legal documents that state your wishes and plans for medical care  These plans are made ahead of time in case you lose your ability to make decisions for yourself   Advance directives can apply to any medical decision, such as the treatments you want, and if you want to donate organs  What are the types of advance directives? There are many types of advance directives, and each state has rules about how to use them  You may choose a combination of any of the following:  · Living will: This is a written record of the treatment you want  You can also choose which treatments you do not want, which to limit, and which to stop at a certain time  This includes surgery, medicine, IV fluid, and tube feedings  · Durable power of  for healthcare [facility]): This is a written record that states who you want to make healthcare choices for you when you are unable to make them for yourself  This person, called a proxy, is usually a family member or a friend  You may choose more than 1 proxy  · Do not resuscitate (DNR) order:  A DNR order is used in case your heart stops beating or you stop breathing  It is a request not to have certain forms of treatment, such as CPR  A DNR order may be included in other types of advance directives  · Medical directive: This covers the care that you want if you are in a coma, near death, or unable to make decisions for yourself  You can list the treatments you want for each condition  Treatment may include pain medicine, surgery, blood transfusions, dialysis, IV or tube feedings, and a ventilator (breathing machine)  · Values history: This document has questions about your views, beliefs, and how you feel and think about life  This information can help others choose the care that you would choose  Why are advance directives important? An advance directive helps you control your care  Although spoken wishes may be used, it is better to have your wishes written down  Spoken wishes can be misunderstood, or not followed  Treatments may be given even if you do not want them  An advance directive may make it easier for your family to make difficult choices about your care  Weight Management   Why it is important to manage your weight:  Being overweight increases your risk of health conditions such as heart disease, high blood pressure, type 2 diabetes, and certain types of cancer  It can also increase your risk for osteoarthritis, sleep apnea, and other respiratory problems  Aim for a slow, steady weight loss  Even a small amount of weight loss can lower your risk of health problems  How to lose weight safely:  A safe and healthy way to lose weight is to eat fewer calories and get regular exercise  You can lose up about 1 pound a week by decreasing the number of calories you eat by 500 calories each day  Healthy meal plan for weight management:  A healthy meal plan includes a variety of foods, contains fewer calories, and helps you stay healthy  A healthy meal plan includes the following:  · Eat whole-grain foods more often  A healthy meal plan should contain fiber  Fiber is the part of grains, fruits, and vegetables that is not broken down by your body  Whole-grain foods are healthy and provide extra fiber in your diet  Some examples of whole-grain foods are whole-wheat breads and pastas, oatmeal, brown rice, and bulgur  · Eat a variety of vegetables every day  Include dark, leafy greens such as spinach, kale, peyton greens, and mustard greens  Eat yellow and orange vegetables such as carrots, sweet potatoes, and winter squash  · Eat a variety of fruits every day  Choose fresh or canned fruit (canned in its own juice or light syrup) instead of juice  Fruit juice has very little or no fiber  · Eat low-fat dairy foods  Drink fat-free (skim) milk or 1% milk  Eat fat-free yogurt and low-fat cottage cheese  Try low-fat cheeses such as mozzarella and other reduced-fat cheeses  · Choose meat and other protein foods that are low in fat  Choose beans or other legumes such as split peas or lentils   Choose fish, skinless poultry (chicken or turkey), or lean cuts of red meat (beef or pork)  Before you cook meat or poultry, cut off any visible fat  · Use less fat and oil  Try baking foods instead of frying them  Add less fat, such as margarine, sour cream, regular salad dressing and mayonnaise to foods  Eat fewer high-fat foods  Some examples of high-fat foods include french fries, doughnuts, ice cream, and cakes  · Eat fewer sweets  Limit foods and drinks that are high in sugar  This includes candy, cookies, regular soda, and sweetened drinks  Exercise:  Exercise at least 30 minutes per day on most days of the week  Some examples of exercise include walking, biking, dancing, and swimming  You can also fit in more physical activity by taking the stairs instead of the elevator or parking farther away from stores  Ask your healthcare provider about the best exercise plan for you  Narcotic (Opioid) Safety    Use narcotics safely:  · Take prescribed narcotics exactly as directed  · Do not give narcotics to others or take narcotics that belong to someone else  · Do not mix narcotics without medicines or alcohol  · Do not drive or operate heavy machinery after you take the narcotic  · Monitor for side effects and notify your healthcare provider if you experienced side effects such as nausea, sleepiness, itching, or trouble thinking clearly  Manage constipation:    Constipation is the most common side effect of narcotic medicine  Constipation is when you have hard, dry bowel movements, or you go longer than usual between bowel movements  Tell your healthcare provider about all changes in your bowel movements while you are taking narcotics  He or she may recommend laxative medicine to help you have a bowel movement  He or she may also change the kind of narcotic you are taking, or change when you take it  The following are more ways you can prevent or relieve constipation:    · Drink liquids as directed  You may need to drink extra liquids to help soften and move your bowels   Ask how much liquid to drink each day and which liquids are best for you  · Eat high-fiber foods  This may help decrease constipation by adding bulk to your bowel movements  High-fiber foods include fruits, vegetables, whole-grain breads and cereals, and beans  Your healthcare provider or dietitian can help you create a high-fiber meal plan  Your provider may also recommend a fiber supplement if you cannot get enough fiber from food  · Exercise regularly  Regular physical activity can help stimulate your intestines  Walking is a good exercise to prevent or relieve constipation  Ask which exercises are best for you  · Schedule a time each day to have a bowel movement  This may help train your body to have regular bowel movements  Bend forward while you are on the toilet to help move the bowel movement out  Sit on the toilet for at least 10 minutes, even if you do not have a bowel movement  Store narcotics safely:   · Store narcotics where others cannot easily get them  Keep them in a locked cabinet or secure area  Do not  keep them in a purse or other bag you carry with you  A person may be looking for something else and find the narcotics  · Make sure narcotics are stored out of the reach of children  A child can easily overdose on narcotics  Narcotics may look like candy to a small child  The best way to dispose of narcotics: The laws vary by country and area  In the [place], the best way is to return the narcotics through a take-back program  This program is offered by the [employer]  The following are options for using the program:  · Take the narcotics to a [employer] collection site  The site is often a law enforcement center  Call your local law enforcement center for scheduled take-back days in your area  You will be given information on where to go if the collection site is in a different location    · Take the narcotics to an approved pharmacy or hospital   A pharmacy or [facility] may be set up as a collection site  You will need to ask if it is a [employer] collection site if you were not directed there  A pharmacy or doctor's office may not be able to take back narcotics unless it is a [employer] site  · Use a mail-back system  This means you are given containers to put the narcotics into  You will then mail them in the containers  · Use a take-back drop box  This is a place to leave the narcotics at any time  People and animals will not be able to get into the box  Your local law enforcement agency can tell you where to find a drop box in your area  Other ways to manage pain:   · Ask your healthcare provider about non-narcotic medicines to control pain  Nonprescription medicines include NSAIDs (such as ibuprofen) and acetaminophen  Prescription medicines include muscle relaxers, antidepressants, and steroids  · Pain may be managed without any medicines  Some ways to relieve pain include massage, aromatherapy, or meditation  Physical or occupational therapy may also help  For more information:   · Drug Enforcement Administration  [address] [place] [address] [address]  Phone: 2- 914 - 476-8782  Web Address: [facility]/drug_disposal/    · [address] and Drug Administration  [facility] [address] [facility]  Phone: 8- 946 - 905-8464  Web Address: http://Keelr/     © Copyright [employer] 2018 Information is for End User's use only and may not be sold, redistributed or otherwise used for commercial purposes   All illustrations and images included in CareNotes® are the copyrighted property of A D A M , Inc  or [address] [place] [address] [employer] [facility]

## 2022-12-22 NOTE — LETTER
December 22, 2022     Patient: Laura Amaya  YOB: 1955  Date of Visit: 12/22/2022      To Whom it May Concern:    Laura Amaya is under my professional care  Meek  was seen in my office on 12/22/2022  Meek Oneill may return to work on 12/28/22  If you have any questions or concerns, please don't hesitate to call           Sincerely,          Juan Gimenez MD        CC: Laura Amaya

## 2022-12-22 NOTE — PROGRESS NOTES
Assessment and Plan:     Patient has been having a cough, congestion and fatigue for the last 6-7 days  She was given a Medrol Dosepak on 12/16 which did not help and today she was given azithromycin and Tessalon Perles to help with her symptoms  She was also told to take Tylenol as needed and drink a lot of fluids       Problem List Items Addressed This Visit    None  Visit Diagnoses     Encounter for screening mammogram for breast cancer        Relevant Orders    Mammo screening bilateral w 3d & cad           Preventive health issues were discussed with patient, and age appropriate screening tests were ordered as noted in patient's After Visit Summary  Personalized health advice and appropriate referrals for health education or preventive services given if needed, as noted in patient's After Visit Summary  History of Present Illness:     Patient presents for a Medicare Wellness Visit    HPI   Acute bronchitis        Patient Care Team:  Seng Reyes MD as PCP - General (Internal Medicine)  Mike Vega MD (Gastroenterology)  Earlene Chaudhary PA-C (Gastroenterology)     Review of Systems:     Review of Systems   Constitutional: Positive for fatigue  Negative for chills, diaphoresis and fever  HENT: Positive for congestion, rhinorrhea and sore throat  Negative for ear discharge, ear pain, hearing loss, postnasal drip, sinus pressure, sinus pain, sneezing and voice change  Eyes: Negative for pain, discharge, redness and visual disturbance  Respiratory: Positive for cough  Negative for chest tightness, shortness of breath and wheezing  Cardiovascular: Negative for chest pain, palpitations and leg swelling  Gastrointestinal: Negative for abdominal distention, abdominal pain, blood in stool, constipation, diarrhea, nausea and vomiting  Endocrine: Negative for cold intolerance, heat intolerance, polydipsia, polyphagia and polyuria     Genitourinary: Negative for dysuria, flank pain, frequency, hematuria and urgency  Musculoskeletal: Negative for arthralgias, back pain, gait problem, joint swelling, myalgias, neck pain and neck stiffness  Skin: Negative for rash  Neurological: Negative for dizziness, tremors, syncope, facial asymmetry, speech difficulty, weakness, light-headedness, numbness and headaches  Hematological: Does not bruise/bleed easily  Psychiatric/Behavioral: Negative for behavioral problems, confusion and sleep disturbance  The patient is not nervous/anxious  Problem List:     Patient Active Problem List   Diagnosis   • GERD (gastroesophageal reflux disease)   • Generalized abdominal pain   • Slow transit constipation   • Mantoux: positive   • Microscopic hematuria   • Chronic midline low back pain without sciatica   • Obesity (BMI 30-39  9)   • Bilateral edema of lower extremity   • Neck pain   • Chronic right shoulder pain      Past Medical and Surgical History:     Past Medical History:   Diagnosis Date   • GERD (gastroesophageal reflux disease)      Past Surgical History:   Procedure Laterality Date   • TUBAL LIGATION        Family History:     Family History   Problem Relation Age of Onset   • Diabetes Mother    • Kidney disease Mother    • Hypertension Mother    • Stroke Father    • Hypertension Sister    • Hypertension Brother       Social History:     Social History     Socioeconomic History   • Marital status: Unknown     Spouse name: None   • Number of children: None   • Years of education: None   • Highest education level: None   Occupational History   • None   Tobacco Use   • Smoking status: Never   • Smokeless tobacco: Never   Vaping Use   • Vaping Use: Never used   Substance and Sexual Activity   • Alcohol use: Never   • Drug use: Never   • Sexual activity: Not Currently     Partners: Male   Other Topics Concern   • None   Social History Narrative   • None     Social Determinants of Health     Financial Resource Strain: Low Risk    • Difficulty of Paying Living Expenses: Not hard at all   Food Insecurity: Not on file   Transportation Needs: No Transportation Needs   • Lack of Transportation (Medical): No   • Lack of Transportation (Non-Medical): No   Physical Activity: Not on file   Stress: Not on file   Social Connections: Not on file   Intimate Partner Violence: Not on file   Housing Stability: Not on file      Medications and Allergies:     Current Outpatient Medications   Medication Sig Dispense Refill   • methylPREDNISolone 4 MG tablet therapy pack Use as directed on package 21 each 0   • nystatin (MYCOSTATIN) 500,000 units/5 mL suspension Apply 5 mL (500,000 Units total) to the mouth or throat 4 (four) times a day 473 mL 3   • pantoprazole (PROTONIX) 40 mg tablet Take 1 tablet (40 mg total) by mouth daily 60 tablet 2   • bacitracin ointment Apply topically 2 (two) times a day       No current facility-administered medications for this visit  No Known Allergies   Immunizations:     Immunization History   Administered Date(s) Administered   • COVID-19 PFIZER VACCINE 0 3 ML IM 01/25/2021, 10/27/2021      Health Maintenance:         Topic Date Due   • DXA SCAN  Never done   • Breast Cancer Screening: Mammogram  Never done   • Colorectal Cancer Screening  12/12/2025   • Hepatitis C Screening  Completed         Topic Date Due   • Hepatitis B Vaccine (1 of 3 - 3-dose series) Never done   • Pneumococcal Vaccine: 65+ Years (1 - PCV) Never done   • COVID-19 Vaccine (3 - Booster for Pfizer series) 12/22/2021   • Influenza Vaccine (1) Never done      Medicare Screening Tests and Risk Assessments:     Kedric Favre is here for her Subsequent Wellness visit  Health Risk Assessment:   Patient rates overall health as fair  Patient feels that their physical health rating is slightly worse  Patient is satisfied with their life  Eyesight was rated as same  Hearing was rated as same  Patient feels that their emotional and mental health rating is same   Patients states they are never, rarely angry  Patient states they are never, rarely unusually tired/fatigued  Pain experienced in the last 7 days has been none  Patient states that she has experienced no weight loss or gain in last 6 months  Fall Risk Screening: In the past year, patient has experienced: no history of falling in past year      Urinary Incontinence Screening:   Patient has not leaked urine accidently in the last six months  Home Safety:  Patient does not have trouble with stairs inside or outside of their home  Patient has working smoke alarms and has working carbon monoxide detector  Home safety hazards include: none  Nutrition:   Current diet is Regular  Medications:   Patient is not currently taking any over-the-counter supplements  Patient is able to manage medications  Activities of Daily Living (ADLs)/Instrumental Activities of Daily Living (IADLs):   Walk and transfer into and out of bed and chair?: Yes  Dress and groom yourself?: Yes    Bathe or shower yourself?: Yes    Feed yourself?  Yes  Do your laundry/housekeeping?: Yes  Manage your money, pay your bills and track your expenses?: Yes  Make your own meals?: Yes    Do your own shopping?: Yes    Previous Hospitalizations:   Any hospitalizations or ED visits within the last 12 months?: No      Advance Care Planning:   Living will: No      Cognitive Screening:   Provider or family/friend/caregiver concerned regarding cognition?: No    PREVENTIVE SCREENINGS      Cardiovascular Screening:    General: Screening Current      Colorectal Cancer Screening:     General: Screening Current      Cervical Cancer Screening:    General: Screening Not Indicated      Lung Cancer Screening:     General: Screening Not Indicated      Hepatitis C Screening:    General: Screening Current    Screening, Brief Intervention, and Referral to Treatment (SBIRT)    Screening  Typical number of drinks in a day: 0  Typical number of drinks in a week: 0  Interpretation: Low risk drinking behavior  Single Item Drug Screening:  How often have you used an illegal drug (including marijuana) or a prescription medication for non-medical reasons in the past year? never    Single Item Drug Screen Score: 0  Interpretation: Negative screen for possible drug use disorder    Review of Current Opioid Use    Opioid Risk Tool (ORT) Interpretation: Complete Opioid Risk Tool (ORT)    Other Counseling Topics:   Car/seat belt/driving safety, skin self-exam, sunscreen and calcium and vitamin D intake and regular weightbearing exercise  No results found  Physical Exam:     /82 (BP Location: Left arm, Patient Position: Sitting, Cuff Size: Standard)   Pulse 99   Temp 97 6 °F (36 4 °C) (Tympanic)   Resp 18   Ht 5' 4" (1 626 m)   Wt 84 8 kg (187 lb)   SpO2 98%   BMI 32 10 kg/m²     Physical Exam  Constitutional:       General: She is not in acute distress  Appearance: She is well-developed  She is not diaphoretic  HENT:      Head: Normocephalic and atraumatic  Right Ear: External ear normal       Left Ear: External ear normal       Nose: Nose normal       Mouth/Throat:      Pharynx: Posterior oropharyngeal erythema present  Eyes:      General: No scleral icterus  Right eye: No discharge  Left eye: No discharge  Conjunctiva/sclera: Conjunctivae normal    Cardiovascular:      Rate and Rhythm: Normal rate and regular rhythm  Heart sounds: Normal heart sounds  No murmur heard  No friction rub  No gallop  Pulmonary:      Effort: Pulmonary effort is normal  No respiratory distress  Breath sounds: Normal breath sounds  No wheezing or rales  Abdominal:      General: Bowel sounds are normal  There is no distension  Palpations: Abdomen is soft  Tenderness: There is no abdominal tenderness  There is no guarding or rebound  Musculoskeletal:         General: No tenderness  Skin:     General: Skin is warm and dry  Findings: No erythema or rash  Neurological:      Mental Status: She is alert and oriented to person, place, and time  Cranial Nerves: No cranial nerve deficit  Sensory: No sensory deficit  Motor: No abnormal muscle tone     Psychiatric:         Behavior: Behavior normal           Pedrito Roman MD

## 2023-01-05 ENCOUNTER — OFFICE VISIT (OUTPATIENT)
Dept: INTERNAL MEDICINE CLINIC | Facility: CLINIC | Age: 68
End: 2023-01-05

## 2023-01-05 VITALS
OXYGEN SATURATION: 99 % | RESPIRATION RATE: 16 BRPM | BODY MASS INDEX: 32.01 KG/M2 | HEIGHT: 64 IN | DIASTOLIC BLOOD PRESSURE: 88 MMHG | WEIGHT: 187.5 LBS | HEART RATE: 75 BPM | TEMPERATURE: 97.6 F | SYSTOLIC BLOOD PRESSURE: 142 MMHG

## 2023-01-05 DIAGNOSIS — L03.115 CELLULITIS OF RIGHT LOWER EXTREMITY: Primary | ICD-10-CM

## 2023-01-05 RX ORDER — CEPHALEXIN 500 MG/1
500 CAPSULE ORAL EVERY 8 HOURS SCHEDULED
Qty: 15 CAPSULE | Refills: 0 | Status: SHIPPED | OUTPATIENT
Start: 2023-01-05 | End: 2023-01-10

## 2023-01-05 NOTE — PROGRESS NOTES
INTERNAL MEDICINE FOLLOW-UP OFFICE VISIT  [facility] AT [facility]    NAME: Ramo Sotomayor  AGE: 79 y o  SEX: female  : 1955   MRN: [id number]    DATE: 2023  TIME: 9:53 AM    Assessment and Plan     Diagnoses and all orders for this visit:    Cellulitis of right lower extremity  -     cephalexin (KEFLEX) 500 mg capsule; Take 1 capsule (500 mg total) by mouth every 8 (eight) hours for 5 days        - Counseling Documentation: patient was counseled regarding: instructions for management, risk factor reductions, prognosis, patient and family education, risks and benefits of treatment options and importance of compliance with treatment  - Medication Side Effects: Adverse side effects of medications were reviewed with the patient/guardian today  Return to office in:  As needed    Chief Complaint     Chief Complaint   Patient presents with   • Leg Pain     Right leg pain   • Cough       History of Present Illness     HPI   has redness and tenderness in the right shin for the last few days  The following portions of the patient's history were reviewed and updated as appropriate: allergies, current medications, past family history, past medical history, past social history, past surgical history and problem list     Review of Systems     Review of Systems   Constitutional: Negative for chills, diaphoresis, fatigue and fever  HENT: Negative for congestion, ear discharge, ear pain, hearing loss, postnasal drip, rhinorrhea, sinus pressure, sinus pain, sneezing, sore throat and voice change  Eyes: Negative for pain, discharge, redness and visual disturbance  Respiratory: Negative for cough, chest tightness, shortness of breath and wheezing  Cardiovascular: Negative for chest pain, palpitations and leg swelling  Gastrointestinal: Negative for abdominal distention, abdominal pain, blood in stool, constipation, diarrhea, nausea and vomiting     Endocrine: Negative for cold intolerance, heat intolerance, polydipsia, polyphagia and polyuria  Genitourinary: Negative for dysuria, flank pain, frequency, hematuria and urgency  Musculoskeletal: Negative for arthralgias, back pain, gait problem, joint swelling, myalgias, neck pain and neck stiffness  Skin: Positive for color change  Negative for rash  Neurological: Negative for dizziness, tremors, syncope, facial asymmetry, speech difficulty, weakness, light-headedness, numbness and headaches  Hematological: Does not bruise/bleed easily  Psychiatric/Behavioral: Negative for behavioral problems, confusion and sleep disturbance  The patient is not nervous/anxious  Active Problem List     Patient Active Problem List   Diagnosis   • GERD (gastroesophageal reflux disease)   • Generalized abdominal pain   • Slow transit constipation   • Mantoux: positive   • Microscopic hematuria   • Chronic midline low back pain without sciatica   • Obesity (BMI 30-39  9)   • Bilateral edema of lower extremity   • Neck pain   • Chronic right shoulder pain       Objective     /88 (BP Location: Left arm, Patient Position: Sitting, Cuff Size: Large)   Pulse 75   Temp 97 6 °F (36 4 °C) (Tympanic)   Resp 16   Ht 5' 4" (1 626 m)   Wt 85 kg (187 lb 8 oz)   SpO2 99%   BMI 32 18 kg/m²     Physical Exam  Constitutional:       General: She is not in acute distress  Appearance: She is well-developed  She is not diaphoretic  HENT:      Head: Normocephalic and atraumatic  Right Ear: External ear normal       Left Ear: External ear normal       Nose: Nose normal    Eyes:      General: No scleral icterus  Right eye: No discharge  Left eye: No discharge  Conjunctiva/sclera: Conjunctivae normal    Neck:      Thyroid: No thyromegaly  Vascular: No JVD  Trachea: No tracheal deviation  Cardiovascular:      Rate and Rhythm: Normal rate and regular rhythm  Heart sounds: Normal heart sounds  No murmur heard  No friction rub  No gallop  Pulmonary:      Effort: Pulmonary effort is normal  No respiratory distress  Breath sounds: Normal breath sounds  No wheezing or rales  Chest:      Chest wall: No tenderness  Abdominal:      General: Bowel sounds are normal  There is no distension  Palpations: Abdomen is soft  Tenderness: There is no abdominal tenderness  There is no guarding or rebound  Musculoskeletal:         General: No tenderness  Normal range of motion  Cervical back: Normal range of motion and neck supple  Lymphadenopathy:      Cervical: No cervical adenopathy  Skin:     General: Skin is warm and dry  Findings: Erythema present  No rash  Comments: Has cellulitis of the right lower extremity   Neurological:      Mental Status: She is alert and oriented to person, place, and time  Cranial Nerves: No cranial nerve deficit  Motor: No abnormal muscle tone        Coordination: Coordination normal    Psychiatric:         Judgment: Judgment normal              Current Medications       Current Outpatient Medications:   •  benzonatate (TESSALON PERLES) 100 mg capsule, Take 1 capsule (100 mg total) by mouth 3 (three) times a day as needed for cough, Disp: 30 capsule, Rfl: 0  •  cephalexin (KEFLEX) 500 mg capsule, Take 1 capsule (500 mg total) by mouth every 8 (eight) hours for 5 days, Disp: 15 capsule, Rfl: 0  •  nystatin (MYCOSTATIN) 500,000 units/5 mL suspension, Apply 5 mL (500,000 Units total) to the mouth or throat 4 (four) times a day, Disp: 473 mL, Rfl: 3  •  bacitracin ointment, Apply topically 2 (two) times a day, Disp: , Rfl:   •  pantoprazole (PROTONIX) 40 mg tablet, Take 1 tablet (40 mg total) by mouth daily (Patient not taking: Reported on 1/5/2023), Disp: 60 tablet, Rfl: 2    Health Maintenance     Health Maintenance   Topic Date Due   • Hepatitis B Vaccine (1 of 3 - 3-dose series) Never done   • DXA SCAN  Never done   • Breast Cancer Screening: Mammogram Never done   • Osteoporosis Screening  Never done   • Falls: Plan of Care  Never done   • Pneumococcal Vaccine: 65+ Years (1 - PCV) Never done   • COVID-19 Vaccine (3 - Booster for Pfizer series) 12/22/2021   • BMI: Followup Plan  04/07/2022   • Influenza Vaccine (1) Never done   • PT PLAN OF CARE  12/23/2022   • Depression Screening  12/16/2023   • Fall Risk  12/22/2023   • Urinary Incontinence Screening  12/22/2023   • Medicare Annual Wellness Visit (AWV)  12/22/2023   • BMI: Adult  12/22/2023   • Colorectal Cancer Screening  12/12/2025   • Hepatitis C Screening  Completed   • HIB Vaccine  Aged Out   • IPV Vaccine  Aged Out   • Hepatitis A Vaccine  Aged Out   • Meningococcal ACWY Vaccine  Aged Out   • HPV Vaccine  Aged Out     Immunization History   Administered Date(s) Administered   • COVID-19 PFIZER VACCINE 0 3 ML IM 01/25/2021, 10/27/2021         Nieves Stearns MD  [id number] AT [facility]

## 2023-02-06 ENCOUNTER — OFFICE VISIT (OUTPATIENT)
Dept: OBGYN CLINIC | Facility: CLINIC | Age: 68
End: 2023-02-06

## 2023-02-06 VITALS
BODY MASS INDEX: 32.61 KG/M2 | SYSTOLIC BLOOD PRESSURE: 129 MMHG | WEIGHT: 191 LBS | HEART RATE: 80 BPM | DIASTOLIC BLOOD PRESSURE: 81 MMHG | HEIGHT: 64 IN

## 2023-02-06 DIAGNOSIS — M75.101 TEAR OF RIGHT ROTATOR CUFF, UNSPECIFIED TEAR EXTENT, UNSPECIFIED WHETHER TRAUMATIC: Primary | ICD-10-CM

## 2023-02-06 DIAGNOSIS — M25.511 CHRONIC RIGHT SHOULDER PAIN: ICD-10-CM

## 2023-02-06 DIAGNOSIS — G89.29 CHRONIC RIGHT SHOULDER PAIN: ICD-10-CM

## 2023-02-06 NOTE — PROGRESS NOTES
Patient Name:  Leonel Dunbar  MRN:  [id number]    [address] [place] [address] [place]     1  Tear of right rotator cuff, unspecified tear extent, unspecified whether traumatic  -     Ambulatory Referral to Physical Therapy; Future    2  Chronic right shoulder pain      79 y o  female with Right shoulder full thickness supraspinatus tear wit hpersistent pain and limited range of motion  Reviewed MRI of Right shoulder with patient  Discussed nonoperative vs surgical management of Right shoulder rotator cuff tear  Nonoperative management would include formal physical therapy and a physician directed home exercise program, activity modification, injection therapy, and oral analgesics  Discussed surgical intervention by means of Right shoulder rotator cuff repair, all associated procedures  Discussed procedure, intraoperative findings, outpatient PT, immobilization, return to driving, return to function  At this time, patient would like to hold off on surgical intervention and continue with outpatient PT  Reviewed risks of prolonged nonoperative treatment in the setting of full thickness rotator cuff tear and patient verbalized understanding and would like to move forward with nonoperative treatment  Discussed possible injection today, but patient declined  New PT script placed today  She will follow up in office in 3 months for reevaluation or sooner if patient wishes to move forward with surgical intervention  History of the Present Illness   Leonel Dunbar is a 79 y o  female with Right shoulder rotator cuff tear  Today, patient reports persistent pain of Right shoulder since last appointment  She admits she had a difficult time attending outpatient PT because of the location, but she still performs home exercises  She admits to difficulty with lifting items with Right upper extremity  She is curious about surgical intervention  Review of Systems     Review of Systems   Constitutional: Negative for chills and fever  HENT: Negative for congestion  Respiratory: Negative for cough, chest tightness and shortness of breath  Cardiovascular: Negative for chest pain and palpitations  Gastrointestinal: Negative for abdominal pain  Endocrine: Negative for cold intolerance and heat intolerance  Neurological: Negative for syncope  Psychiatric/Behavioral: Negative for confusion  Physical Exam     /81   Pulse 80   Ht 5' 4" (1 626 m)   Wt 86 6 kg (191 lb)   BMI 32 79 kg/m²     Right Shoulder: Active range of motion   140 degrees forward flexion  140 degrees abduction  40 degrees external rotation   SI joint internal rotation    Passive range of motion   150 degrees of forward flexion     There is  tenderness present over the greater tuberosity of humerus  There is 4/5 strength with external rotation testing at the side  Empty can testing is positive   Belly press test is negative  James test is positive  Loretto's test is negative    Speed's test is Positive  The patient is neurovascularly intact distally in the extremity  Data Review     I have personally reviewed pertinent films in PACS, and my interpretation follows  MRI of the right shoulder taken on 10/20/2022 demonstrates a small full-thickness tear of the anterior supraspinatus without retraction or atrophy  Mild tenosynovitis of the long head of the biceps tendon  Small tear of the superior glenoid labrum  X-rays taken 02/28/2022 of Right shoulder demonstrates calcific density superior to humeral head, possible calcific tendonitis  No acute fracture, dislocation; glenohumeral joint space well maintained       Social History     Tobacco Use   • Smoking status: Never   • Smokeless tobacco: Never   Vaping Use   • Vaping Use: Never used   Substance Use Topics   • Alcohol use: Never   • Drug use: Never           Procedures  None     Aubree Jorgensen DO

## 2023-03-06 ENCOUNTER — EVALUATION (OUTPATIENT)
Dept: PHYSICAL THERAPY | Facility: CLINIC | Age: 68
End: 2023-03-06

## 2023-03-06 DIAGNOSIS — M75.101 TEAR OF RIGHT ROTATOR CUFF, UNSPECIFIED TEAR EXTENT, UNSPECIFIED WHETHER TRAUMATIC: ICD-10-CM

## 2023-03-06 NOTE — PROGRESS NOTES
PT Evaluation     Today's date: 3/6/2023  Patient name: Kailyn Quintero  : 1955  MRN: [id number]  Referring provider: Karron Hamman, DO  Dx:   Encounter Diagnosis     ICD-10-CM    1  Tear of right rotator cuff, unspecified tear extent, unspecified whether traumatic  M75 101 Ambulatory Referral to Physical Therapy                     Assessment  Assessment details: Patient is a 78 y/o female with chief complaints of Right shoulder pain 1+ years in duration  MRI of the right shoulder taken on 10/20/2022 demonstrates a small full-thickness tear of the anterior supraspinatus without retraction or atrophy  Mild tenosynovitis of the long head of the biceps tendon  Small tear of the superior glenoid labrum  Patient presents with decreased functional mobility due to increased pain, decreased strength, decreased ROM associated with RTC tear  Patient will benefit from skilled physical therapy to address impairment and improve functional mobility  PT needed to allow for return to maximal function and improve quality of life  Impairments: abnormal or restricted ROM, activity intolerance, impaired physical strength, lacks appropriate home exercise program and pain with function  Understanding of Dx/Px/POC: good   Prognosis: good    Goals  STG within 4 weeks:   1  Patient to be independent in HEP  2  Reduce pain by 50% to improve quality of life  3  Improve shoulder AROM by 10 degrees in all planes  LTG within 8 weeks:   1  Patient to be independent in ADLs/IADLs without difficulty  2  Patient to be able to reach overhead with minimal difficulty to improve ADLs  3  Patient to be independent in comprehensive HEP       Plan  Patient would benefit from: skilled physical therapy and PT eval  Planned modality interventions: cryotherapy, hydrotherapy and unattended electrical stimulation  Planned therapy interventions: therapeutic training, therapeutic exercise, therapeutic activities, stretching, strengthening, postural training, patient education, neuromuscular re-education, manual therapy, joint mobilization, IADL retraining, activity modification, ADL retraining, ADL training, body mechanics training, flexibility, functional ROM exercises, gait training, graded activity, graded exercise, graded motor and home exercise program  Frequency: 1x week  Duration in weeks: 8  Plan of Care beginning date: 3/6/2023  Plan of Care expiration date: 2023  Treatment plan discussed with: patient        Subjective Evaluation    History of Present Illness  Mechanism of injury: Patient is a 80 y/o female with chief complaints of right shoulder pain that began 1+ years ago  She states she constantly uses her right arm to lift (20 lb bag of rice, case of water, fire logs, etc), which she attributes to onset of pain  MRI of the right shoulder taken on 10/20/2022 demonstrates a small full-thickness tear of the anterior supraspinatus without retraction or atrophy  Mild tenosynovitis of the long head of the biceps tendon  Small tear of the superior glenoid labrum  She was seen by ortho, reviewed MRI, and declined cortisone injection  Patient wishes to trial conservative management with formal PT    Pain  At best pain ratin  At worst pain ratin  Relieving factors: heat  Aggravating factors: overhead activity and lifting  Symptom course: a little better     Social Support    Employment status: working ()  Hand dominance: right  Exercise history: table slide, wall slide       Diagnostic Tests  MRI studies: abnormal (R shoulder: RTC tear )  Treatments  Previous treatment: physical therapy  Patient Goals  Patient goals for therapy: decreased pain and increased motion          Objective     Active Range of Motion   Left Shoulder   Flexion: 145 degrees   Extension: 38 degrees   Abduction: 140 degrees   External rotation 0°: 75 degrees   Internal rotation BTB: L5     Right Shoulder   Flexion: 80 degrees Extension: 10 degrees   Abduction: 80 degrees   External rotation 0°: 10 degrees   Internal rotation BTB: Active internal rotation behind the back: unable      Strength/Myotome Testing     Right Shoulder     Planes of Motion   Flexion: 2   Abduction: 2   External rotation at 0°: 2   Internal rotation at 0°: 2              Precautions: R RTC tear     Increased time spent on patient education with diagnosis, prognosis, goals of therapy, progression of therapy, and plan of care  All questions answered  Patient instructed to call clinic with questions or concerns  Enrique Dempsey:   [employer]  Access Code: [email or web address]        Manuals 3/6                                                                Neuro Re-Ed             PSR x20             Scap retraction  x20                                                                              Ther Ex             Pt Edu KS            Pulleys 3 min            Supine cane flex NV            Finger ladder x10             Pendulums- side/side, fwd/bwd x20 ea                                                    Ther Activity                                       Gait Training                                       Modalities

## 2023-03-15 ENCOUNTER — OFFICE VISIT (OUTPATIENT)
Dept: PHYSICAL THERAPY | Facility: CLINIC | Age: 68
End: 2023-03-15

## 2023-03-15 DIAGNOSIS — M75.101 TEAR OF RIGHT ROTATOR CUFF, UNSPECIFIED TEAR EXTENT, UNSPECIFIED WHETHER TRAUMATIC: Primary | ICD-10-CM

## 2023-03-15 NOTE — PROGRESS NOTES
Daily Note     Today's date: 3/15/2023  Patient name: Senthil Argueta  : 1955  MRN: [id number]  Referring provider: Anai Jarrett DO  Dx:   Encounter Diagnosis     ICD-10-CM    1  Tear of right rotator cuff, unspecified tear extent, unspecified whether traumatic  M75  101                      Subjective: Patient reports 5/10 pain  She states her pain is worse at night  Objective: See treatment diary below      Assessment: Tolerated treatment well  Patient exhibits improved ROM from start to finish of session  She's able to progress exercise program to include theraband row/extension and supine cane flexion  Updated her written HEP  Patient would benefit from continued PT      Plan: Continue per plan of care  Precautions: R RTC tear       [employer]:   [employer]  Access Code: [email or web address]        Manuals 3/6 3/15                                                               Neuro Re-Ed             PSR x20  x20            Scap retraction  x20  x20            retroUBE  x3 min           TB Row  Red 2x10            TB ext  Red 2x10                                      Ther Ex             Pt Edu KS KS           Pulleys 3 min 3 min           Supine cane flex NV 3"x10            Finger ladder x10  x10            Pendulums- side/side, fwd/bwd x20 ea             PROM R shld    KS                                     Ther Activity                                       Gait Training                                       Modalities

## 2023-03-16 ENCOUNTER — OFFICE VISIT (OUTPATIENT)
Dept: INTERNAL MEDICINE CLINIC | Facility: CLINIC | Age: 68
End: 2023-03-16

## 2023-03-16 VITALS
RESPIRATION RATE: 18 BRPM | TEMPERATURE: 97.8 F | SYSTOLIC BLOOD PRESSURE: 122 MMHG | HEIGHT: 64 IN | DIASTOLIC BLOOD PRESSURE: 86 MMHG | WEIGHT: 187 LBS | HEART RATE: 104 BPM | OXYGEN SATURATION: 99 % | BODY MASS INDEX: 31.92 KG/M2

## 2023-03-16 DIAGNOSIS — G44.52 NEW DAILY PERSISTENT HEADACHE: Primary | ICD-10-CM

## 2023-03-16 NOTE — PROGRESS NOTES
INTERNAL MEDICINE FOLLOW-UP OFFICE VISIT  [facility]    NAME: Zuleika Butt  AGE: 79 y o  SEX: female  : 1955   MRN: [id number]    DATE: 3/16/2023  TIME: 2:18 PM    Assessment and Plan     Diagnoses and all orders for this visit:    New daily persistent headache  -     MRI brain wo contrast; Future  -     Ambulatory Referral to Neurology; Future    We will get back to her with the results of the MRI of the brain  Was also told to see the neurologist     - Counseling Documentation: patient was counseled regarding: instructions for management, risk factor reductions, prognosis, patient and family education, risks and benefits of treatment options and importance of compliance with treatment  - Medication Side Effects: Adverse side effects of medications were reviewed with the patient/guardian today  Return to office in: As needed    Chief Complaint     Chief Complaint   Patient presents with   • Headache       History of Present Illness     HPI  Patient has been having headache at the back of the head and upper neck for the last 2 weeks  It is worse when she goes to bed and she cannot sleep because of the headache  The following portions of the patient's history were reviewed and updated as appropriate: allergies, current medications, past family history, past medical history, past social history, past surgical history and problem list     Review of Systems     Review of Systems   Constitutional: Negative for chills, diaphoresis, fatigue and fever  HENT: Negative for congestion, ear discharge, ear pain, hearing loss, postnasal drip, rhinorrhea, sinus pressure, sinus pain, sneezing, sore throat and voice change  Eyes: Negative for pain, discharge, redness and visual disturbance  Respiratory: Negative for cough, chest tightness, shortness of breath and wheezing  Cardiovascular: Negative for chest pain, palpitations and leg swelling     Gastrointestinal: Negative for abdominal distention, abdominal pain, blood in stool, constipation, diarrhea, nausea and vomiting  Endocrine: Negative for cold intolerance, heat intolerance, polydipsia, polyphagia and polyuria  Genitourinary: Negative for dysuria, flank pain, frequency, hematuria and urgency  Musculoskeletal: Positive for neck pain  Negative for arthralgias, back pain, gait problem, joint swelling, myalgias and neck stiffness  Skin: Negative for rash  Neurological: Positive for headaches  Negative for dizziness, tremors, syncope, facial asymmetry, speech difficulty, weakness, light-headedness and numbness  Hematological: Does not bruise/bleed easily  Psychiatric/Behavioral: Positive for sleep disturbance  Negative for behavioral problems and confusion  The patient is not nervous/anxious  Active Problem List     Patient Active Problem List   Diagnosis   • GERD (gastroesophageal reflux disease)   • Generalized abdominal pain   • Slow transit constipation   • Mantoux: positive   • Microscopic hematuria   • Chronic midline low back pain without sciatica   • Obesity (BMI 30-39  9)   • Bilateral edema of lower extremity   • Neck pain   • Chronic right shoulder pain       Objective     /86 (BP Location: Left arm, Patient Position: Sitting, Cuff Size: Large)   Pulse 104   Temp 97 8 °F (36 6 °C) (Tympanic)   Resp 18   Ht 5' 4" (1 626 m)   Wt 84 8 kg (187 lb)   SpO2 99%   BMI 32 10 kg/m²     Physical Exam  Constitutional:       General: She is not in acute distress  Appearance: She is well-developed  She is not diaphoretic  HENT:      Head: Normocephalic and atraumatic  Right Ear: External ear normal       Left Ear: External ear normal       Nose: Nose normal    Eyes:      General: No scleral icterus  Right eye: No discharge  Left eye: No discharge  Conjunctiva/sclera: Conjunctivae normal    Neck:      Thyroid: No thyromegaly  Vascular: No JVD        Trachea: No tracheal deviation  Cardiovascular:      Rate and Rhythm: Normal rate and regular rhythm  Heart sounds: Normal heart sounds  No murmur heard  No friction rub  No gallop  Pulmonary:      Effort: Pulmonary effort is normal  No respiratory distress  Breath sounds: Normal breath sounds  No wheezing or rales  Chest:      Chest wall: No tenderness  Abdominal:      General: Bowel sounds are normal  There is no distension  Palpations: Abdomen is soft  Tenderness: There is no abdominal tenderness  There is no guarding or rebound  Musculoskeletal:         General: No tenderness  Normal range of motion  Cervical back: Normal range of motion and neck supple  Lymphadenopathy:      Cervical: No cervical adenopathy  Skin:     General: Skin is warm and dry  Findings: No erythema or rash  Neurological:      Mental Status: She is alert and oriented to person, place, and time  Cranial Nerves: No cranial nerve deficit  Motor: No abnormal muscle tone        Coordination: Coordination normal    Psychiatric:         Judgment: Judgment normal            Current Medications       Current Outpatient Medications:   •  pantoprazole (PROTONIX) 40 mg tablet, Take 1 tablet (40 mg total) by mouth daily, Disp: 60 tablet, Rfl: 2  •  bacitracin ointment, Apply topically 2 (two) times a day, Disp: , Rfl:     Health Maintenance     Health Maintenance   Topic Date Due   • DXA SCAN  Never done   • Breast Cancer Screening: Mammogram  Never done   • Osteoporosis Screening  Never done   • Falls: Plan of Care  Never done   • Pneumococcal Vaccine: 65+ Years (1 - PCV) Never done   • COVID-19 Vaccine (3 - Booster for Pfizer series) 12/22/2021   • BMI: Followup Plan  04/07/2022   • Influenza Vaccine (1) Never done   • PT PLAN OF CARE  04/05/2023   • Depression Screening  12/16/2023   • Urinary Incontinence Screening  12/22/2023   • Medicare Annual Wellness Visit (AWV)  12/22/2023   • Fall Risk 03/06/2024   • BMI: Adult  03/16/2024   • Colorectal Cancer Screening  12/12/2025   • Hepatitis C Screening  Completed   • HIB Vaccine  Aged Out   • IPV Vaccine  Aged Out   • Hepatitis A Vaccine  Aged Out   • Meningococcal ACWY Vaccine  Aged Out   • HPV Vaccine  Aged Out     Immunization History   Administered Date(s) Administered   • COVID-19 PFIZER VACCINE 0 3 ML IM 01/25/2021, 10/27/2021         Chaitanya Ramos MD  [address] [facility] AT [facility]

## 2023-03-22 ENCOUNTER — OFFICE VISIT (OUTPATIENT)
Dept: PHYSICAL THERAPY | Facility: CLINIC | Age: 68
End: 2023-03-22

## 2023-03-22 DIAGNOSIS — M75.101 TEAR OF RIGHT ROTATOR CUFF, UNSPECIFIED TEAR EXTENT, UNSPECIFIED WHETHER TRAUMATIC: Primary | ICD-10-CM

## 2023-03-22 NOTE — PROGRESS NOTES
Daily Note     Today's date: 3/22/2023  Patient name: Luz Pickering  : 1955  MRN: [id number]  Referring provider: Abdias Aragon DO  Dx:   Encounter Diagnosis     ICD-10-CM    1  Tear of right rotator cuff, unspecified tear extent, unspecified whether traumatic  M75  101                      Subjective: Pt reports her shoulder is getting better  Objective: See treatment diary below      Assessment: Tolerated treatment well  Overall she is doing very well  She reported no pain throughout treatment today  She was able to tolerate more exercise than previously today  Patient would benefit from continued PT      Plan: Continue per plan of care  Progress treatment as tolerated  Precautions: R RTC tear       [employer]:   [employer]  Access Code: [email or web address]        Manuals 3/6 3/15 3/22                                                              Neuro Re-Ed             PSR x20  x20            Scap retraction  x20  x20            retroUBE  x3 min Fwd 3'          TB Row  Red 2x10  RTB 2x10          TB ext  Red 2x10  RTB 2x10                                    Ther Ex             Pt Edu KS KS           Pulleys 3 min 3 min 5'          Supine cane flex NV 3"x10  40x          Finger ladder x10  x10  10x          Pendulums- side/side, fwd/bwd x20 ea             PROM R shld    KS YUNI                                    Ther Activity                                       Gait Training                                       Modalities

## 2023-03-29 ENCOUNTER — OFFICE VISIT (OUTPATIENT)
Dept: PHYSICAL THERAPY | Facility: CLINIC | Age: 68
End: 2023-03-29

## 2023-03-29 DIAGNOSIS — M75.101 TEAR OF RIGHT ROTATOR CUFF, UNSPECIFIED TEAR EXTENT, UNSPECIFIED WHETHER TRAUMATIC: Primary | ICD-10-CM

## 2023-03-29 NOTE — PROGRESS NOTES
"Daily Note     Today's date: 3/29/2023  Patient name: Maximiliano Funk  : 1955  MRN: [id number]  Referring provider: Kamila Thapa DO  Dx:   Encounter Diagnosis     ICD-10-CM    1  Tear of right rotator cuff, unspecified tear extent, unspecified whether traumatic  M75  101                      Subjective: Pt continues to note difficulty lifting items with right arm, but states, \"Everything else is improving  \" She states she's not having pain at night anymore  Objective: See treatment diary below      Assessment: Tolerated treatment well  Patient fatigues with addition of exercises this session  She's educated that more AROM and strengthening exercises should improve her ability to lift  Her PROM has shown improvements since starting therapy  Patient would benefit from continued PT      Plan: Continue per plan of care  Progress treatment as tolerated  Precautions: R RTC tear       [employer]:   [email or web address]  Access Code: [email or web address]        Manuals 3/6 3/15 3/22 3/29          Gr 3-4 R GHJ post/inf mobs    5'                                                Neuro Re-Ed             PSR x20  x20            Scap retraction  x20  x20            retroUBE  x3 min Fwd 3' Alt 6'          TB Row  Red 2x10  RTB 2x10 Green 2x10          TB ext  Red 2x10  RTB 2x10 Green 2x10          Standing shld abd/flex    2x10 ea                       Ther Ex             Pt Edu KS KS           Pulleys 3 min 3 min 5' 5'          Supine cane flex NV 3\"x10  40x 2x10         Finger ladder x10  x10  10x 1# x10          Pendulums- side/side, fwd/bwd x20 ea             PROM R shld    KS YUNI KS         Chest press     2# 2x10          Supine cane flexion     2# 2x10          Ther Activity                                       Gait Training                                       Modalities                                            "

## 2023-04-05 ENCOUNTER — OFFICE VISIT (OUTPATIENT)
Dept: PHYSICAL THERAPY | Facility: CLINIC | Age: 68
End: 2023-04-05

## 2023-04-05 DIAGNOSIS — M75.101 TEAR OF RIGHT ROTATOR CUFF, UNSPECIFIED TEAR EXTENT, UNSPECIFIED WHETHER TRAUMATIC: Primary | ICD-10-CM

## 2023-04-05 NOTE — PROGRESS NOTES
"Daily Note     Today's date: 2023  Patient name: Willie Villa  : 1955  MRN: [id number]  Referring provider: Talat Rivera DO  Dx:   Encounter Diagnosis     ICD-10-CM    1  Tear of right rotator cuff, unspecified tear extent, unspecified whether traumatic  M75  101                      Subjective: Pt states she's having less pain and sleeping better  Objective: See treatment diary below      Assessment: Tolerated treatment well  Patient able to progress exercises to include TB IR/ER  She exhibits improved strength and ROM since starting PT  She fatigues less following exercises this visit compared to last visit  Updated written HEP provided to patient  Patient would benefit from continued PT      Plan: Continue per plan of care  Progress treatment as tolerated  Precautions: R RTC tear       [employer]:   [employer]  Access Code: [email or web address]        Manuals 3/6 3/15 3/22 3/29  45        Gr 3-4 R GHJ post/inf mobs    5' 5'                                               Neuro Re-Ed             PSR x20  x20    x20         Scap retraction  x20  x20    x20         retroUBE  x3 min Fwd 3' Alt 6'  Alt 4'         TB Row  Red 2x10  RTB 2x10 Green 2x10  Green 2x10         TB ext  Red 2x10  RTB 2x10 Green 2x10  Green 2x10         Standing shld abd/flex    2x10 ea                       Ther Ex             Pt Edu KS KS   KS        Pulleys 3 min 3 min 5' 5'  4'         Supine cane flex NV 3\"x10  40x 2x10 2x10         Finger ladder x10  x10  10x 1# x10          Pendulums- side/side, fwd/bwd x20 ea             PROM R shld    KS YUNI KS KS        Chest press     2# 2x10          Supine cane flexion     2# 2x10  2x10         Tb IR/ER     Red 2x10 ea        Ther Activity                                       Gait Training                                       Modalities                                            "

## 2023-04-26 ENCOUNTER — OFFICE VISIT (OUTPATIENT)
Dept: PHYSICAL THERAPY | Facility: CLINIC | Age: 68
End: 2023-04-26

## 2023-04-26 DIAGNOSIS — G89.29 CHRONIC MIDLINE LOW BACK PAIN WITH LEFT-SIDED SCIATICA: Primary | ICD-10-CM

## 2023-04-26 DIAGNOSIS — M75.101 TEAR OF RIGHT ROTATOR CUFF, UNSPECIFIED TEAR EXTENT, UNSPECIFIED WHETHER TRAUMATIC: ICD-10-CM

## 2023-04-26 DIAGNOSIS — M54.42 CHRONIC MIDLINE LOW BACK PAIN WITH LEFT-SIDED SCIATICA: Primary | ICD-10-CM

## 2023-04-26 NOTE — PROGRESS NOTES
PT Evaluation for lumbar region; PT treatment for shoulder     Today's date: 2023  Patient name: Caitlyn Casanova  : 1955  MRN: [id number]  Referring provider: Spike Longoria PT, DPT   Dx:   Encounter Diagnosis     ICD-10-CM    1  Chronic midline low back pain with left-sided sciatica  M54 42     G89 29                      Assessment  Assessment details: Patient is a 80 y/o female with complaints of chronic low back pain 30+ years in duration  She has never had treatment for this  She presents with decreased functional mobility due to increased pain, decreased LE strength, decreased lumbar ROM, decreased BLE flexibility, and decreased core strength associated with general deconditioning, positive neural tension possibly secondary to nerve root impingement, and cannot rule out lumbar DJD  Patient will benefit from skilled physical therapy to address impairment and improve functional mobility  PT needed to allow for return to maximal function and improve quality of life  Impairments: abnormal or restricted ROM, activity intolerance, impaired physical strength, lacks appropriate home exercise program and pain with function  Understanding of Dx/Px/POC: good   Prognosis: good    Goals  STG within 4 weeks:   1  Patient to be independent in HEP  2  Reduce pain by 50% to improve quality of life  3  Improve shoulder AROM by 10 degrees in all planes  4  ADDED- Improve lumbar ROM to no more than minimal limitation  5  ADDED- Improve hip strength to 4+ in all planes  LTG within 8 weeks:   1  Patient to be independent in ADLs/IADLs without difficulty  2  Patient to be able to reach overhead with minimal difficulty to improve ADLs  3  Patient to be independent in comprehensive HEP  4  ADDED- Patient to exhibit correct lifting technique without pain  Plan  Plan details: Recommend adding lumbar region into PT treatment  Request PCP to sign off on plan of care     Patient would benefit from: "skilled physical therapy and PT eval  Planned modality interventions: cryotherapy, hydrotherapy and unattended electrical stimulation  Planned therapy interventions: therapeutic training, therapeutic exercise, therapeutic activities, stretching, strengthening, postural training, patient education, neuromuscular re-education, manual therapy, joint mobilization, IADL retraining, activity modification, ADL retraining, ADL training, body mechanics training, flexibility, functional ROM exercises, gait training, graded activity, graded exercise, graded motor and home exercise program  Frequency: 1x week  Duration in weeks: 8  Plan of Care beginning date: 4/26/2023  Plan of Care expiration date: 6/21/2023  Treatment plan discussed with: patient        Subjective Evaluation    History of Present Illness  Mechanism of injury: Patient is a 80 y/o female with chronic midline low back pain that began 30+ years ago with insidious onset with intermittent frequency  She states she has more back pain when she lifts  States she has never had treatment for this before  Pain  Pain scale at lowest: low back: 2, right shoulder: 0  Pain scale at highest: low back: 8, right shoulder: 1  Relieving factors: heat  Symptom course: shoulder: improved  Social Support    Employment status: working ()  Hand dominance: right  Exercise history: table slide, wall slide       Diagnostic Tests  MRI studies: abnormal (R shoulder: RTC tear )  Treatments  Treatments tried: lumbar: no treatment; shoulder: PT   Patient Goals  Patient goals for therapy: decreased pain and increased motion  Patient goal: \"To be able to lift without as much back pain  \"        Objective     Active Range of Motion   Left Shoulder   Flexion: 145 degrees   Extension: 38 degrees   Abduction: 140 degrees   External rotation 0°: 75 degrees   Internal rotation BTB: L5     Right Shoulder   Flexion: 80 degrees   Extension: 10 degrees   Abduction: 80 degrees " "  External rotation 0°: 10 degrees   Internal rotation BTB: Active internal rotation behind the back: unable      Lumbar   Flexion:  with pain Restriction level: moderate  Extension:  with pain Restriction level: moderate  Left lateral flexion:  with pain Restriction level: moderate  Right lateral flexion:  with pain Restriction level: moderate    Strength/Myotome Testing     Right Shoulder     Planes of Motion   Flexion: 2   Abduction: 2   External rotation at 0°: 2   Internal rotation at 0°: 2     Left Hip   Planes of Motion   Flexion: 4  Extension: 3    Right Hip   Planes of Motion   Flexion: 4  Extension: 3    Left Knee   Flexion: 4+  Extension: 4+    Right Knee   Flexion: 4+  Extension: 4+    Left Ankle/Foot   Dorsiflexion: 4  Great toe extension: 4+    Right Ankle/Foot   Dorsiflexion: 4+  Great toe extension: 4+    Tests     Lumbar     Left   Positive slump test               Precautions: R RTC tear     Increased time spent on patient education with diagnosis, prognosis, goals of therapy, progression of therapy, and plan of care  All questions answered  Patient instructed to call clinic with questions or concerns  Lawrence Curran:   [employer]  Access Code: [email or web address]        Manuals 3/6 3/15 3/22 3/29  4/5 4/12 4/19  4/26     Gr 3-4 R GHJ post/inf mobs    5' 5' 8' 8'  8'                                            Neuro Re-Ed             PSR x20  x20    x20         Scap retraction  x20  x20    x20         retroUBE  x3 min Fwd 3' Alt 6'  Alt 4'  Alt 5'  bwd 5'  bwd 5'      TB Row  Red 2x10  RTB 2x10 Green 2x10  Green 2x10  Green 2x10  green 2x10       TB ext  Red 2x10  RTB 2x10 Green 2x10  Green 2x10  Green 2x10  green 2x10       Standing shld abd/flex    2x10 ea    2# 2x10 ea 2# x 10ea, 3# x10 ea                   Ther Ex             Pt Edu KS KS   KS KS KS KS     Pulleys 3 min 3 min 5' 5'  4'  4 min 4 min      Supine cane flex NV 3\"x10  40x 2x10 2x10  2x10        Finger ladder x10  x10  10x 1# x10  " Pendulums- side/side, fwd/bwd x20 ea             PROM R shld    KS YUNI KS KS KS KS KS     Chest press     2# 2x10   3# 2x10        Tb IR/ER     Red 2x10 ea Red 2x10 ea  Green 2x10 ea       Bicep curl         3# 2x10      Ther Activity                                       Gait Training                                       Modalities

## 2023-05-03 ENCOUNTER — APPOINTMENT (OUTPATIENT)
Dept: PHYSICAL THERAPY | Facility: CLINIC | Age: 68
End: 2023-05-03
Payer: MEDICARE

## 2023-05-10 ENCOUNTER — OFFICE VISIT (OUTPATIENT)
Dept: PHYSICAL THERAPY | Facility: CLINIC | Age: 68
End: 2023-05-10

## 2023-05-10 DIAGNOSIS — G89.29 CHRONIC MIDLINE LOW BACK PAIN WITH LEFT-SIDED SCIATICA: Primary | ICD-10-CM

## 2023-05-10 DIAGNOSIS — M54.42 CHRONIC MIDLINE LOW BACK PAIN WITH LEFT-SIDED SCIATICA: Primary | ICD-10-CM

## 2023-05-10 NOTE — PROGRESS NOTES
"Daily Note     Today's date: 5/10/2023  Patient name: Alyssa Nur  : 1955  MRN: [id number]  Referring provider: Orion Hatch DO for shoulder; Blu Serna, PT, DPT for lumbar   Dx:   Encounter Diagnosis     ICD-10-CM    1  Chronic midline low back pain with left-sided sciatica  M54 42     G89 29                      Subjective: Patient states her back is sore  Objective: See treatment diary below      Assessment: Tolerated treatment fair  Patient exhibits fatigue during and post exercise  She does however note less pain from start to finish of session  She is educated in importance of strengthening the hip and core musculature  Patient would benefit from continued PT  Updated written HEP provided to patient      Plan: Continue per plan of care  Precautions: R RTC tear, chronic low back pain        [employer]:   [employer]  Access Code: [email or web address]        Manuals 3/6 3/15 3/22 3/29  4/5 4/12 4/19  4/26 5/10     Gr 3-4 R GHJ post/inf mobs    5' 5' 8' 8'  8'                                            Neuro Re-Ed             PSR x20  x20    x20         Scap retraction  x20  x20    x20         retroUBE  x3 min Fwd 3' Alt 6'  Alt 4'  Alt 5'  bwd 5'  bwd 5'      TB Row  Red 2x10  RTB 2x10 Green 2x10  Green 2x10  Green 2x10  green 2x10       TB ext  Red 2x10  RTB 2x10 Green 2x10  Green 2x10  Green 2x10  green 2x10       Standing shld abd/flex    2x10 ea    2# 2x10 ea 2# x 10ea, 3# x10 ea      Adductor set         3\"x20     TA engagement          3\"x20     HL hip abd         Red 2x10     PGM Clamshell         10\"x 10                  Ther Ex             Pt Edu KS KS   KS KS KS KS KS    Recumbent bike         5 min    Pulleys 3 min 3 min 5' 5'  4'  4 min 4 min      Supine cane flex NV 3\"x10  40x 2x10 2x10  2x10        Finger ladder x10  x10  10x 1# x10          Pendulums- side/side, fwd/bwd x20 ea             PROM R shld    KS YUNI KS KS KS KS KS     Chest press     2# 2x10   3# " 2x10        Tb IR/ER     Red 2x10 ea Red 2x10 ea  Green 2x10 ea       Bicep curl         3# 2x10      Ther Activity                                       Gait Training                                       Modalities

## 2023-05-24 ENCOUNTER — OFFICE VISIT (OUTPATIENT)
Dept: PHYSICAL THERAPY | Facility: CLINIC | Age: 68
End: 2023-05-24

## 2023-05-24 DIAGNOSIS — M54.42 CHRONIC MIDLINE LOW BACK PAIN WITH LEFT-SIDED SCIATICA: Primary | ICD-10-CM

## 2023-05-24 DIAGNOSIS — G89.29 CHRONIC MIDLINE LOW BACK PAIN WITH LEFT-SIDED SCIATICA: Primary | ICD-10-CM

## 2023-05-24 NOTE — PROGRESS NOTES
"Daily Note     Today's date: 2023  Patient name: Colleen Schafer  : 1955  MRN: [id number]  Referring provider:  Lorena Payne, PT, DPT for lumbar   Dx:   Encounter Diagnosis     ICD-10-CM    1  Chronic midline low back pain with left-sided sciatica  M54 42     G89 29                      Subjective: Patient states her back is feeling a little better, but that anytime she lifts heavy, she has back pain  Her chief complaint is right calf/posterior knee pain  Objective: See treatment diary below      Assessment: Tolerated treatment well  Patient is shown right calf stretch to address tightness in this region  No pain following bike and calf stretch  Progression of core exercises and BLE manual stretching  No back pain post session  Patient encouraged to perform daily HEP for low back, as she currently is not performing  Patient would benefit from continued PT  Plan: Continue per plan of care  Precautions: R RTC tear, chronic low back pain        Lee Silber:   [email or web address]  Access Code: [email or web address]        Manuals 3/6 3/15 3/22 3/29  4/5 4/12 4/19  4/26 5/10  5/24   Gr 3-4 R GHJ post/inf mobs    5' 5' 8' 8'  8'                                            Neuro Re-Ed             PSR x20  x20    x20         Scap retraction  x20  x20    x20         retroUBE  x3 min Fwd 3' Alt 6'  Alt 4'  Alt 5'  bwd 5'  bwd 5'      TB Row  Red 2x10  RTB 2x10 Green 2x10  Green 2x10  Green 2x10  green 2x10       TB ext  Red 2x10  RTB 2x10 Green 2x10  Green 2x10  Green 2x10  green 2x10       Standing shld abd/flex    2x10 ea    2# 2x10 ea 2# x 10ea, 3# x10 ea      Adductor set         3\"x20  3\"x20    TA engagement          3\"x20  3\"x20    HL hip abd         Red 2x10  Red 2x10 ea    PGM Clamshell         10\"x 10     Paloff press           Red 2x10 ea    Ther Ex             Pt Edu KS KS   KS KS KS KS KS KS   Recumbent bike         5 min 10 min   Pulleys 3 min 3 min 5' 5'  4'  4 min 4 min      Supine " "cane flex NV 3\"x10  40x 2x10 2x10  2x10        Finger ladder x10  x10  10x 1# x10          Pendulums- side/side, fwd/bwd x20 ea             PROM R shld    KS YUNI KS KS KS KS KS     Chest press     2# 2x10   3# 2x10        Tb IR/ER     Red 2x10 ea Red 2x10 ea  Green 2x10 ea       Bicep curl         3# 2x10      BLE manual stretching- HS, piri           KS    Ther Activity                                       Gait Training                                       Modalities                                                 "

## 2023-05-31 ENCOUNTER — OFFICE VISIT (OUTPATIENT)
Dept: PHYSICAL THERAPY | Facility: CLINIC | Age: 68
End: 2023-05-31

## 2023-05-31 DIAGNOSIS — M54.42 CHRONIC MIDLINE LOW BACK PAIN WITH LEFT-SIDED SCIATICA: Primary | ICD-10-CM

## 2023-05-31 DIAGNOSIS — G89.29 CHRONIC MIDLINE LOW BACK PAIN WITH LEFT-SIDED SCIATICA: Primary | ICD-10-CM

## 2023-05-31 NOTE — PROGRESS NOTES
"Daily Note     Today's date: 2023  Patient name: Attila Hopkins  : 1955  MRN: [id number]  Referring provider:  Carolin Rebolledo, PT, DPT for lumbar   Dx:   Encounter Diagnosis     ICD-10-CM    1  Chronic midline low back pain with left-sided sciatica  M54 42     G89 29                      Subjective: Patient continues to report her back feels better  She states her right calf and behind her knee is bothering her  Objective: See treatment diary below      Assessment: Tolerated treatment well  Following bike and calf stretch, she no longer has posterior knee/calf pain  She's encouraged to perform this at home  She is challenged with core and hip strengthening, but able to perform without increased low back pain  Recommend continued strengthening  Patient would benefit from continued PT  Plan: Continue per plan of care  Precautions: R RTC tear, chronic low back pain        [employer]:   [employer]  Access Code: [email or web address]        Manuals 5/31   3/29  4/5 4/12 4/19  4/26 5/10  5/24   Gr 3-4 R GHJ post/inf mobs    5' 5' 8' 8'  8'                                            Neuro Re-Ed             PSR     x20         Scap retraction      x20         retroUBE    Alt 6'  Alt 4'  Alt 5'  bwd 5'  bwd 5'      TB Row    Green 2x10  Green 2x10  Green 2x10  green 2x10       TB ext    Green 2x10  Green 2x10  Green 2x10  green 2x10       Standing shld abd/flex    2x10 ea    2# 2x10 ea 2# x 10ea, 3# x10 ea      Adductor set 3\"X20         3\"x20  3\"x20    TA engagement  3\"x20         3\"x20  3\"x20    HL hip abd green 2x10 ea         Red 2x10  Red 2x10 ea    PGM Clamshell         10\"x 10     Paloff press  Red 2x10 ea         Red 2x10 ea    Ther Ex             Pt Edu KS    KS KS KS KS KS KS   Recumbent bike 10 min        5 min 10 min   Pulleys    5'  4'  4 min 4 min      Supine cane flex    2x10 2x10  2x10        Finger ladder    1# x10          Pendulums- side/side, fwd/bwd             PROM R " "shld      KS KS KS KS KS     Chest press     2# 2x10   3# 2x10        Tb IR/ER     Red 2x10 ea Red 2x10 ea  Green 2x10 ea       Bicep curl         3# 2x10      BLE manual stretching- HS, i  KS         KS    Calf stretch at wall  5x20\"             Ther Activity                                       Gait Training                                       Modalities                                                 "

## 2023-06-14 ENCOUNTER — OFFICE VISIT (OUTPATIENT)
Age: 68
End: 2023-06-14
Payer: MEDICARE

## 2023-06-14 VITALS
OXYGEN SATURATION: 98 % | TEMPERATURE: 98.3 F | HEIGHT: 64 IN | BODY MASS INDEX: 32.3 KG/M2 | SYSTOLIC BLOOD PRESSURE: 138 MMHG | RESPIRATION RATE: 18 BRPM | DIASTOLIC BLOOD PRESSURE: 92 MMHG | HEART RATE: 90 BPM | WEIGHT: 189.2 LBS

## 2023-06-14 DIAGNOSIS — R21 RASH: ICD-10-CM

## 2023-06-14 DIAGNOSIS — J20.9 ACUTE BRONCHITIS, UNSPECIFIED ORGANISM: Primary | ICD-10-CM

## 2023-06-14 PROCEDURE — 99213 OFFICE O/P EST LOW 20 MIN: CPT | Performed by: INTERNAL MEDICINE

## 2023-06-14 RX ORDER — AMOXICILLIN AND CLAVULANATE POTASSIUM 875; 125 MG/1; MG/1
1 TABLET, FILM COATED ORAL EVERY 12 HOURS SCHEDULED
Qty: 20 TABLET | Refills: 0 | Status: SHIPPED | OUTPATIENT
Start: 2023-06-14 | End: 2023-06-24

## 2023-06-14 RX ORDER — BENZONATATE 200 MG/1
200 CAPSULE ORAL 3 TIMES DAILY PRN
Qty: 20 CAPSULE | Refills: 0 | Status: SHIPPED | OUTPATIENT
Start: 2023-06-14

## 2023-06-14 NOTE — PROGRESS NOTES
INTERNAL MEDICINE FOLLOW-UP OFFICE VISIT  [facility] of BEHAVIORAL MEDICINE AT [facility]    NAME: Jany Wallace  AGE: 79 y o  SEX: female  : 1955   MRN: [id number]    DATE: 2023  TIME: 11:07 AM    Assessment and Plan     Diagnoses and all orders for this visit:    Acute bronchitis, unspecified organism  -     amoxicillin-clavulanate (AUGMENTIN) 875-125 mg per tablet; Take 1 tablet by mouth every 12 (twelve) hours for 10 days  -     benzonatate (TESSALON) 200 MG capsule; Take 1 capsule (200 mg total) by mouth 3 (three) times a day as needed for cough    Rash  -     Ambulatory Referral to Dermatology; Future        - Counseling Documentation: patient was counseled regarding: instructions for management, risk factor reductions, prognosis, patient and family education, risks and benefits of treatment options and importance of compliance with treatment  - Medication Side Effects: Adverse side effects of medications were reviewed with the patient/guardian today  Return to office in: as needed    Chief Complaint     Chief Complaint   Patient presents with   • Follow-up       History of Present Illness     Cough  This is a new problem  The current episode started yesterday  The problem has been gradually worsening  The problem occurs every few minutes  The cough is productive of purulent sputum  Associated symptoms include chills, nasal congestion, rhinorrhea, a sore throat, shortness of breath and wheezing  Pertinent negatives include no chest pain, ear pain, eye redness, fever, headaches, myalgias, postnasal drip or rash  Nothing aggravates the symptoms  She has tried nothing for the symptoms  The following portions of the patient's history were reviewed and updated as appropriate: allergies, current medications, past family history, past medical history, past social history, past surgical history and problem list     Review of Systems     Review of Systems   Constitutional: Positive for chills  "Negative for diaphoresis, fatigue and fever  HENT: Positive for congestion, rhinorrhea and sore throat  Negative for ear discharge, ear pain, hearing loss, postnasal drip, sinus pressure, sinus pain, sneezing and voice change  Eyes: Negative for pain, discharge, redness and visual disturbance  Respiratory: Positive for cough, shortness of breath and wheezing  Negative for chest tightness  Cardiovascular: Negative for chest pain, palpitations and leg swelling  Gastrointestinal: Negative for abdominal distention, abdominal pain, blood in stool, constipation, diarrhea, nausea and vomiting  Endocrine: Negative for cold intolerance, heat intolerance, polydipsia, polyphagia and polyuria  Genitourinary: Negative for dysuria, flank pain, frequency, hematuria and urgency  Musculoskeletal: Negative for arthralgias, back pain, gait problem, joint swelling, myalgias, neck pain and neck stiffness  Skin: Negative for rash  Neurological: Negative for dizziness, tremors, syncope, facial asymmetry, speech difficulty, weakness, light-headedness, numbness and headaches  Hematological: Does not bruise/bleed easily  Psychiatric/Behavioral: Negative for behavioral problems, confusion and sleep disturbance  The patient is not nervous/anxious  Active Problem List     Patient Active Problem List   Diagnosis   • GERD (gastroesophageal reflux disease)   • Generalized abdominal pain   • Slow transit constipation   • Mantoux: positive   • Microscopic hematuria   • Chronic midline low back pain without sciatica   • Obesity (BMI 30-39  9)   • Bilateral edema of lower extremity   • Neck pain   • Chronic right shoulder pain       Objective     /92 (BP Location: Right arm, Patient Position: Sitting, Cuff Size: Standard)   Pulse 90   Temp 98 3 °F (36 8 °C) (Tympanic)   Resp 18   Ht 5' 4\" (1 626 m)   Wt 85 8 kg (189 lb 3 2 oz)   SpO2 98%   BMI 32 48 kg/m²     Physical Exam  Constitutional:       General: " She is not in acute distress  Appearance: She is well-developed  She is not diaphoretic  HENT:      Head: Normocephalic and atraumatic  Right Ear: External ear normal       Left Ear: External ear normal       Nose: Nose normal    Eyes:      General: No scleral icterus  Right eye: No discharge  Left eye: No discharge  Conjunctiva/sclera: Conjunctivae normal    Neck:      Thyroid: No thyromegaly  Vascular: No JVD  Trachea: No tracheal deviation  Cardiovascular:      Rate and Rhythm: Normal rate and regular rhythm  Heart sounds: Normal heart sounds  No murmur heard  No friction rub  No gallop  Pulmonary:      Effort: Pulmonary effort is normal  No respiratory distress  Breath sounds: Wheezing and rales present  Chest:      Chest wall: No tenderness  Abdominal:      General: Bowel sounds are normal  There is no distension  Palpations: Abdomen is soft  Tenderness: There is no abdominal tenderness  There is no guarding or rebound  Musculoskeletal:         General: No tenderness  Normal range of motion  Cervical back: Normal range of motion and neck supple  Lymphadenopathy:      Cervical: No cervical adenopathy  Skin:     General: Skin is warm and dry  Findings: No erythema or rash  Neurological:      Mental Status: She is alert and oriented to person, place, and time  Cranial Nerves: No cranial nerve deficit  Motor: No abnormal muscle tone        Coordination: Coordination normal    Psychiatric:         Judgment: Judgment normal              Current Medications       Current Outpatient Medications:   •  amoxicillin-clavulanate (AUGMENTIN) 875-125 mg per tablet, Take 1 tablet by mouth every 12 (twelve) hours for 10 days, Disp: 20 tablet, Rfl: 0  •  benzonatate (TESSALON) 200 MG capsule, Take 1 capsule (200 mg total) by mouth 3 (three) times a day as needed for cough, Disp: 20 capsule, Rfl: 0  •  pantoprazole (PROTONIX) 40 mg tablet, Take 1 tablet (40 mg total) by mouth daily, Disp: 60 tablet, Rfl: 2  •  bacitracin ointment, Apply topically 2 (two) times a day, Disp: , Rfl:     Health Maintenance     Health Maintenance   Topic Date Due   • DXA SCAN  Never done   • Breast Cancer Screening: Mammogram  Never done   • Osteoporosis Screening  Never done   • Falls: Plan of Care  Never done   • Pneumococcal Vaccine: 65+ Years (1 - PCV) Never done   • COVID-19 Vaccine (3 - Pfizer series) 12/22/2021   • BMI: Followup Plan  04/07/2022   • PT PLAN OF CARE  05/26/2023   • Influenza Vaccine (Season Ended) 09/01/2023   • Urinary Incontinence Screening  12/22/2023   • Medicare Annual Wellness Visit (AWV)  12/22/2023   • BMI: Adult  03/16/2024   • Fall Risk  04/26/2024   • Depression Screening  06/14/2024   • Colorectal Cancer Screening  12/12/2025   • Hepatitis C Screening  Completed   • HIB Vaccine  Aged Out   • IPV Vaccine  Aged Out   • Hepatitis A Vaccine  Aged Out   • Meningococcal ACWY Vaccine  Aged Out   • HPV Vaccine  Aged Out     Immunization History   Administered Date(s) Administered   • COVID-19 PFIZER VACCINE 0 3 ML IM 01/25/2021, 10/27/2021         Preethi Palomino MD  [address] [facility] of BEHAVIORAL MEDICINE AT [place] [facility]

## 2023-08-07 ENCOUNTER — OFFICE VISIT (OUTPATIENT)
Dept: OBGYN CLINIC | Facility: CLINIC | Age: 68
End: 2023-08-07
Payer: MEDICARE

## 2023-08-07 VITALS
SYSTOLIC BLOOD PRESSURE: 137 MMHG | DIASTOLIC BLOOD PRESSURE: 79 MMHG | BODY MASS INDEX: 32.27 KG/M2 | WEIGHT: 189 LBS | HEART RATE: 73 BPM | HEIGHT: 64 IN

## 2023-08-07 DIAGNOSIS — M17.11 PRIMARY OSTEOARTHRITIS OF RIGHT KNEE: Primary | ICD-10-CM

## 2023-08-07 PROCEDURE — 99213 OFFICE O/P EST LOW 20 MIN: CPT | Performed by: ORTHOPAEDIC SURGERY

## 2023-08-07 NOTE — PROGRESS NOTES
Patient Name:  Rebecca Little  MRN:  [id number]    [id number]     1. Primary osteoarthritis of right knee  -     Ambulatory Referral to Physical Therapy; Future        Right knee osteoarthritis. · The patient's previous images were reviewed today. · Discussed treatment options including physical therapy and corticosteroid injections. · The patient was referred to physical therapy. · The patient declined a corticosteroid injection today. · Follow-up as needed. History of the Present Illness   Rebecca Little is a 79 y.o. female with Right knee osteoarthritis. Pain is rated a 9/10. She reports she was on vacation and was on a 4 hour plane right. Following this, her pain began. She notes her right shoulder pain is greatly improved. She completed physical therapy. Review of Systems     Review of Systems   Constitutional: Negative for appetite change and unexpected weight change. HENT: Negative for congestion and trouble swallowing. Eyes: Negative for visual disturbance. Respiratory: Negative for cough and shortness of breath. Cardiovascular: Negative for chest pain and palpitations. Gastrointestinal: Negative for nausea and vomiting. Endocrine: Negative for cold intolerance and heat intolerance. Musculoskeletal: Positive for arthralgias. Negative for joint swelling and myalgias. Skin: Negative for rash. Neurological: Negative for numbness. Physical Exam     /79   Pulse 73   Ht 5' 4" (1.626 m)   Wt 85.7 kg (189 lb)   BMI 32.44 kg/m²     Right Knee  Range of motion from 0 to 120 with pain at terminal flexion. There is no effusion. There is tenderness over the medial joint line. The patient is able to perform a straight leg raise with 5/5 quad strength. Varus stress testing reveals no instability at 0 and 30 degrees   Valgus stress testing reveals no instability at 0 and 30 degrees  The patient is neurovascular intact distally.     Right Shoulder: Active range of motion   160 degrees forward flexion  160 degrees abduction  50 degrees external rotation   Equal lower thoracic internal rotation      There is no tenderness present. There is 5/5 strength with supraspinatus testing. There is 5/5 strength with infraspinatus testing. There is 5/5 strength with subscapularis testing. James test is negative   The patient is neurovascularly intact distally in the extremity. Data Review     I have personally reviewed pertinent films in PACS, and my interpretation follows. X-rays taken 2/28/23 of Right knee demonstrate subtle narrowing of the medial tibiofemoral and patellofemoral joint spaces with osteophyte formation. No acute fracture. Social History     Tobacco Use   • Smoking status: Never   • Smokeless tobacco: Never   Vaping Use   • Vaping Use: Never used   Substance Use Topics   • Alcohol use: Never   • Drug use: Never           Procedures  None performed.      Isi Mendoza   Scribe Attestation    I,:  Isi Mendoza am acting as a scribe while in the presence of the attending physician.:       I,:  Smitha Alarcon DO personally performed the services described in this documentation    as scribed in my presence.:

## 2023-08-30 ENCOUNTER — EVALUATION (OUTPATIENT)
Dept: PHYSICAL THERAPY | Facility: CLINIC | Age: 68
End: 2023-08-30
Payer: MEDICARE

## 2023-08-30 DIAGNOSIS — M17.11 PRIMARY OSTEOARTHRITIS OF RIGHT KNEE: Primary | ICD-10-CM

## 2023-08-30 PROCEDURE — 97161 PT EVAL LOW COMPLEX 20 MIN: CPT | Performed by: PHYSICAL MEDICINE & REHABILITATION

## 2023-08-30 NOTE — PROGRESS NOTES
PT Evaluation     Today's date: 2023  Patient name: Emma Ochoa  : 1955  MRN: [id number]  Referring provider: Rola Chi DO  Dx:   Encounter Diagnosis     ICD-10-CM    1. Primary osteoarthritis of right knee  M17.11 Ambulatory Referral to Physical Therapy                     Assessment  Assessment details: Pt is a pleasant 79 y.o. female presenting to outpatient physical therapy with sgs/sxs that appear consistent with referring diagnosis. Pt presents with pain, decreased range of motion, decreased strength, and decreased tolerance to activity. Pt is a good candidate for outpatient physical therapy and would benefit from skilled physical therapy to address limitations and to achieve goals. Thank you for this referral.   Impairments: abnormal coordination, abnormal or restricted ROM, activity intolerance, impaired physical strength and pain with function  Understanding of Dx/Px/POC: good   Prognosis: good    Goals  ST. Patient will report 25% decrease in pain in 4 weeks. 2. Patient will demonstrate 25% improvement in ROM in 4 weeks. 3. Patient will demonstrate 1/2 grade improvement in strength in 4 weeks. LT. Patient will be able to perform IADLS without restriction or pain by discharge. 2. Patient will be independent in HEP by discharge. 3. Patient will be able to return to recreational/work duties without restriction or pain by discharge.       Plan  Patient would benefit from: PT eval and skilled PT  Planned modality interventions: cryotherapy and thermotherapy: hydrocollator packs  Planned therapy interventions: IADL retraining, body mechanics training, flexibility, functional ROM exercises, home exercise program, neuromuscular re-education, manual therapy, postural training, strengthening, stretching, therapeutic activities, therapeutic exercise and joint mobilization  Frequency: 2x week  Duration in visits: 12  Duration in weeks: 6  Treatment plan discussed with: patient        Subjective Evaluation    History of Present Illness  Mechanism of injury: Patient presents with c/o R knee pain present for the past few months. Patient notes she has TTP through medial knee. Increased pain with rising to stand, initial walking, stairclimbing. Limited walking tolerance. Decreased pain with use of ACE wrap, Tylenol prn. (-) N/T  X-ray from  shows degenerative changes.    Patient Goals  Patient goals for therapy: decreased pain    Pain  At worst pain ratin          Objective     Strength/Myotome Testing     Right Knee   Flexion: 4  Extension: 4    Additional Strength Details  Pain with resisted motion    General Comments:      Knee Comments  (+) TTP through pes anserine  Limited patellar mobility  Pain with quad set  ROM 0-105  (-) ligamentous laxity testing  Guarding with additional attempted testing limiting hip and knee ROM         Precautions: R knee OA    Manuals             R patellar mobs                                                    Neuro Re-Ed             TKE                                                                                           Ther Ex             Trial bike             Seated HS st HEP            LAQ HEP            HR             Step up fwd/lat             Bridge             SAQ                          Ther Activity                                       Gait Training                                       Modalities

## 2023-09-07 ENCOUNTER — OFFICE VISIT (OUTPATIENT)
Dept: PHYSICAL THERAPY | Facility: CLINIC | Age: 68
End: 2023-09-07
Payer: MEDICARE

## 2023-09-07 DIAGNOSIS — M17.11 PRIMARY OSTEOARTHRITIS OF RIGHT KNEE: Primary | ICD-10-CM

## 2023-09-07 PROCEDURE — 97110 THERAPEUTIC EXERCISES: CPT

## 2023-09-07 NOTE — PROGRESS NOTES
Daily Note     Today's date: 2023  Patient name: Yoko Boyer  : 1955  MRN: [id number]  Referring provider: Hortencia Robb DO  Dx:   Encounter Diagnosis     ICD-10-CM    1. Primary osteoarthritis of right knee  M17.11           Start Time: 1745  Stop Time: 1830  Total time in clinic (min): 45 minutes    Subjective: Patient reports some mild pain in right knee 7/10. Objective: See treatment diary below      Assessment: Tolerated treatment well. First session after initial evaluation. Patient participated in skilled PT session focused on strengthening, stretching, and ROM. Patient able to complete exercise program with a relief in R knee pain. Patient deconditioned and challenged with exercises, needing a rest break between sets. Educated patient on DOMS and explained she may experience some soreness in the morning or next day. Patient needed verbal and visual cues for proper form with exercises. Patient would continue to benefit from skilled PT interventions to address strengthening, stretching, and ROM. Patient demonstrated fatigue post treatment      Plan: Continue per plan of care.       Precautions: R knee OA    Manuals             R patellar mobs  CD                                                  Neuro Re-Ed             TKE  RTB 2x10 RLE                                                                                         Ther Ex             Trial bike  Bike L1 x 8 min           Seated HS st HEP 30" 3x ea           LAQ HEP 2x10 ea           HR  30x           Step up fwd/lat  4" step Fwd step ps x 10 ea           Bridge  10x           SAQ  3" 2x10 ea                        Ther Activity                                       Gait Training                                       Modalities

## 2023-09-12 ENCOUNTER — OFFICE VISIT (OUTPATIENT)
Dept: PHYSICAL THERAPY | Facility: CLINIC | Age: 68
End: 2023-09-12
Payer: MEDICARE

## 2023-09-12 DIAGNOSIS — M17.11 PRIMARY OSTEOARTHRITIS OF RIGHT KNEE: Primary | ICD-10-CM

## 2023-09-12 PROCEDURE — 97140 MANUAL THERAPY 1/> REGIONS: CPT | Performed by: PHYSICAL THERAPIST

## 2023-09-12 PROCEDURE — 97110 THERAPEUTIC EXERCISES: CPT | Performed by: PHYSICAL THERAPIST

## 2023-09-12 NOTE — PROGRESS NOTES
Daily Note     Today's date: 2023  Patient name: Pro Russ  : 1955  MRN: [id number]  Referring provider: Chacorta Vaca DO  Dx:   Encounter Diagnosis     ICD-10-CM    1. Primary osteoarthritis of right knee  M17.11                      Subjective: Patient states her knee is a little better following exercise. Objective: See treatment diary below      Assessment: Tolerated treatment well. Patient challenged with strengthening exercises for the hip. She overall exhibits decreased strength and endurance. This visit, she has minimal pain and significant improvement noted post patellar mobilization. Patient demonstrated fatigue post treatment      Plan: Continue per plan of care. Precautions: R knee OA    [email or web address]  Access Code: [email or web address]      Manuals            R patellar mobs  CD Inf, med [place]                                                 Neuro Re-Ed             TKE  RTB 2x10 RLE                                                                                         Ther Ex             HEP implementation    KS & pt Edu           Bike for ROM and strength   Bike L1 x 8 min Bike 5 min          Seated HS st HEP 30" 3x ea           LAQ HEP 2x10 ea           HR  30x           Step up fwd/lat  4" step Fwd step ps x 10 ea           Bridge  10x           SAQ  3" 2x10 ea           Standing hip abd/ext   Red 2x10 ea          Side step with band    Red x4 at rail           Ther Activity             Mini squat    2x10                        Gait Training                                       Modalities

## 2023-09-28 ENCOUNTER — APPOINTMENT (OUTPATIENT)
Dept: PHYSICAL THERAPY | Facility: CLINIC | Age: 68
End: 2023-09-28
Payer: MEDICARE

## 2023-10-13 ENCOUNTER — APPOINTMENT (OUTPATIENT)
Dept: PHYSICAL THERAPY | Facility: CLINIC | Age: 68
End: 2023-10-13
Payer: MEDICARE

## 2023-10-18 ENCOUNTER — OFFICE VISIT (OUTPATIENT)
Dept: PHYSICAL THERAPY | Facility: CLINIC | Age: 68
End: 2023-10-18
Payer: MEDICARE

## 2023-10-18 DIAGNOSIS — M17.11 LOCALIZED OSTEOARTHRITIS OF RIGHT KNEE: Primary | ICD-10-CM

## 2023-10-18 DIAGNOSIS — G89.29 CHRONIC PAIN OF RIGHT KNEE: ICD-10-CM

## 2023-10-18 DIAGNOSIS — M25.561 CHRONIC PAIN OF RIGHT KNEE: ICD-10-CM

## 2023-10-18 PROCEDURE — 97112 NEUROMUSCULAR REEDUCATION: CPT | Performed by: PHYSICAL THERAPIST

## 2023-10-18 PROCEDURE — 97110 THERAPEUTIC EXERCISES: CPT | Performed by: PHYSICAL THERAPIST

## 2023-10-18 NOTE — PROGRESS NOTES
week  Duration in weeks: 6  Plan of Care beginning date: 10/18/2023  Plan of Care expiration date: 2023  Treatment plan discussed with: patient      Subjective Evaluation    History of Present Illness  Mechanism of injury: Patient presents with c/o R knee pain present for the past few months. Patient notes she has TTP through medial knee. Increased pain with rising to stand, initial walking, stairclimbing. Limited walking tolerance. Decreased pain with use of ACE wrap, Tylenol prn. (-) N/T  X-ray from  shows degenerative changes. 10/18/23: Originally, she reports pain at 9/10. However, after describing pain scale, she states she is able to walk around all day, but requires a heating pain at night. Reports 4 out of 10 pain. Patient states she is not doing her home exercise program but does feel better when she does 1 or 2 stretches. Patient Goals  Patient goals for therapy: decreased pain    Pain  At best pain ratin  At worst pain ratin  Quality: dull ache  Relieving factors: heat (stretching)  Aggravating factors: stair climbing and lifting  Progression: no change        Objective     Strength/Myotome Testing     Left Knee   Flexion: 4+  Extension: 4+    Right Knee   Flexion: 4+  Extension: 4+    Additional Strength Details  Pain with resisted motion    General Comments:      Knee Comments  (+) TTP through pes anserine  Limited patellar mobility  Pain with quad set  ROM 0-105  (-) ligamentous laxity testing  Guarding with additional attempted testing limiting hip and knee ROM         Precautions: R knee OA    [email or web address]  Access Code: [employer]    Manuals  9/7 9/12 10/18          R patellar mobs  CD Inf, med KS                                                 Neuro Re-Ed             TKE  RTB 2x10 RLE  Green 2x10 ea                                                                                        Ther Ex             Re-eval     KS         HEP implementation    KS & pt Edu KS          Bike for ROM and strength   Bike L1 x 8 min Bike 5 min Bike 5 min          Seated HS st HEP 30" 3x ea           LAQ HEP 2x10 ea           HR  30x           Step up fwd/lat  4" step Fwd step ps x 10 ea           Bridge  10x           SAQ  3" 2x10 ea           Standing hip abd/ext   Red 2x10 ea Green 2x10 ea          Side step with band    Red x4 at rail  Green x 4 at rail          Ther Activity             Mini squat    2x10  2x10                       Gait Training                                       Modalities

## 2023-10-25 ENCOUNTER — OFFICE VISIT (OUTPATIENT)
Dept: PHYSICAL THERAPY | Facility: CLINIC | Age: 68
End: 2023-10-25
Payer: MEDICARE

## 2023-10-25 DIAGNOSIS — M25.561 CHRONIC PAIN OF RIGHT KNEE: ICD-10-CM

## 2023-10-25 DIAGNOSIS — G89.29 CHRONIC PAIN OF RIGHT KNEE: ICD-10-CM

## 2023-10-25 DIAGNOSIS — M17.11 LOCALIZED OSTEOARTHRITIS OF RIGHT KNEE: Primary | ICD-10-CM

## 2023-10-25 PROCEDURE — 97112 NEUROMUSCULAR REEDUCATION: CPT | Performed by: PHYSICAL THERAPIST

## 2023-10-25 PROCEDURE — 97110 THERAPEUTIC EXERCISES: CPT | Performed by: PHYSICAL THERAPIST

## 2023-10-25 PROCEDURE — 97140 MANUAL THERAPY 1/> REGIONS: CPT | Performed by: PHYSICAL THERAPIST

## 2023-10-25 NOTE — PROGRESS NOTES
Daily Note     Today's date: 10/25/2023  Patient name: Colt Salazar  : 1955  MRN: [id number]  Referring provider: Irene Rosenberg DO  Dx:   Encounter Diagnosis     ICD-10-CM    1. Localized osteoarthritis of right knee  M17.11       2. Chronic pain of right knee  M25.561     G89.29                      Subjective: Patient reports feeling better when she does her exercises. Objective: See treatment diary below      Assessment: Tolerated treatment well. Patient exhibits improved exercise tolerance overall this visit. No complaints of pain during or post session and less fatigue noted following session. She continues to be encouraged to perform home exercise program on a consistent basis for maximal effectiveness. Patient would benefit from continued PT      Plan: Continue per plan of care. Precautions: R knee OA    [email or web address]  Access Code: [employer]    Manuals  9/7 9/12 10/18  10/25        R patellar mobs  CD Inf, med KS  B Inf, med KS                                                 Neuro Re-Ed             TKE  RTB 2x10 RLE  Green 2x10 ea  Green 2x10 ea         Biodex LOS     Lv 10 floor x 5                                                                          Ther Ex             Re-eval     KS         HEP implementation    KS & pt Edu  KS  review        Bike for ROM and strength   Bike L1 x 8 min Bike 5 min Bike 5 min  8 min        Seated HS st HEP 30" 3x ea           LAQ HEP 2x10 ea           HR  30x           Step up fwd/lat  4" step Fwd step ps x 10 ea           Bridge  10x           SAQ  3" 2x10 ea           Standing hip abd/ext   Red 2x10 ea Green 2x10 ea  Green 2x10 ea        Side step with band    Red x4 at rail  Green x 4 at [employer]  x6 at rail         Ther Activity             Mini squat    2x10  2x10  2x10                      Gait Training Modalities

## 2023-11-01 ENCOUNTER — OFFICE VISIT (OUTPATIENT)
Dept: PHYSICAL THERAPY | Facility: CLINIC | Age: 68
End: 2023-11-01
Payer: MEDICARE

## 2023-11-01 DIAGNOSIS — G89.29 CHRONIC PAIN OF RIGHT KNEE: ICD-10-CM

## 2023-11-01 DIAGNOSIS — M17.11 LOCALIZED OSTEOARTHRITIS OF RIGHT KNEE: Primary | ICD-10-CM

## 2023-11-01 DIAGNOSIS — M25.561 CHRONIC PAIN OF RIGHT KNEE: ICD-10-CM

## 2023-11-01 PROCEDURE — 97110 THERAPEUTIC EXERCISES: CPT

## 2023-11-01 NOTE — PROGRESS NOTES
Daily Note     Today's date: 2023  Patient name: Colt Salazar  : 1955  MRN: [id number]  Referring provider: Irene Rosenberg DO  Dx:   Encounter Diagnosis     ICD-10-CM    1. Localized osteoarthritis of right knee  M17.11       2. Chronic pain of right knee  M25.561     G89.29                      Subjective: Patient states compliance with HEP and her knee has not been as painful. Arrives 10 min late to PT and is accommodated. Objective: See treatment diary below      Assessment: Tolerated treatment well. She did well with proprioception on biodex, more challenged laterally than fwd/bwd. Able to perform step up and up and overs for functional strengthening with no increased pain or compensation. Requires frequent cues for repetitions and to initiate proper technique. Cues to avoid leaning with standing hip series. Patient demonstrated fatigue post treatment and would benefit from continued PT      Plan: Progress treatment as tolerated. Precautions: R knee OA    [email or web address]  Access Code: [employer]ie CARMENZA WALLACE    Manuals  9/7 9/12 10/18  10/25 11/1       R patellar mobs  CD Inf, med KS  B Inf, med KS                                                 Neuro Re-Ed             TKE  RTB 2x10 RLE  Green 2x10 ea  Green 2x10 ea  Green 2x10 ea       Biodex LOS     Lv 10 floor x 5  Lv 9  floor x4                                                                        Ther Ex             Re-eval     KS         HEP implementation    KS & pt Edu  KS  review        Bike for ROM and strength   Bike L1 x 8 min Bike 5 min Bike 5 min  8 min Bike 5 min        Seated HS st HEP 30" 3x ea           LAQ HEP 2x10 ea           HR  30x           Step up fwd/lat  4" step Fwd step ps x 10 ea    6" step fwd 10x  4" up and over 10x       Bridge  10x           SAQ  3" 2x10 ea           Standing hip abd/ext   Red 2x10 ea Green 2x10 ea  Green 2x10 ea Green 2x10 ea       Side step with band    Red x4 at rail  Green x 4 at rail  [employer] [facility] x6 at rail        Ther Activity             Mini squat    2x10  2x10  2x10  2x10                     Gait Training                                                  Modalities

## 2023-11-16 ENCOUNTER — APPOINTMENT (OUTPATIENT)
Dept: PHYSICAL THERAPY | Facility: CLINIC | Age: 68
End: 2023-11-16
Payer: MEDICARE

## 2023-11-22 ENCOUNTER — APPOINTMENT (OUTPATIENT)
Dept: PHYSICAL THERAPY | Facility: CLINIC | Age: 68
End: 2023-11-22
Payer: MEDICARE

## 2023-11-30 ENCOUNTER — APPOINTMENT (OUTPATIENT)
Dept: PHYSICAL THERAPY | Facility: CLINIC | Age: 68
End: 2023-11-30
Payer: MEDICARE

## 2024-01-19 DIAGNOSIS — Z12.31 ENCOUNTER FOR SCREENING MAMMOGRAM FOR MALIGNANT NEOPLASM OF BREAST: Primary | ICD-10-CM

## 2024-08-29 ENCOUNTER — OFFICE VISIT (OUTPATIENT)
Age: 69
End: 2024-08-29
Payer: MEDICARE

## 2024-08-29 VITALS
OXYGEN SATURATION: 98 % | BODY MASS INDEX: 31.24 KG/M2 | HEIGHT: 64 IN | HEART RATE: 78 BPM | DIASTOLIC BLOOD PRESSURE: 80 MMHG | WEIGHT: 183 LBS | SYSTOLIC BLOOD PRESSURE: 118 MMHG

## 2024-08-29 DIAGNOSIS — R13.19 ESOPHAGEAL DYSPHAGIA: ICD-10-CM

## 2024-08-29 DIAGNOSIS — K21.9 GASTROESOPHAGEAL REFLUX DISEASE, UNSPECIFIED WHETHER ESOPHAGITIS PRESENT: Primary | ICD-10-CM

## 2024-08-29 PROCEDURE — 99214 OFFICE O/P EST MOD 30 MIN: CPT | Performed by: INTERNAL MEDICINE

## 2024-08-29 RX ORDER — TRIAMCINOLONE ACETONIDE 1 MG/G
1 OINTMENT TOPICAL 2 TIMES DAILY
COMMUNITY
Start: 2024-06-18 | End: 2025-06-18

## 2024-08-29 NOTE — PROGRESS NOTES
[facility]      Chief Complaint: Burning and swallowing problems    HPI:  Janice Andres is a 68 y.o.  female who presents with a sensation of substernal burning going on for at least 3 weeks.  She has also been having food getting stuck on the way down.  She has suffered from GERD in the past.  She is not taking any medication for this.  There is no other definitive exacerbating or remitting factor.  Apparently no problem with liquids.  She is not having any weight loss.  No melena or hematochezia.  No nausea or vomiting.  No change in the skin consistency.  No Raynaud's.  No nocturnal symptomatology.  No other complaints.      Review of Systems:   Constitutional: No fever or chills, feels well, no tiredness, no recent weight gain or weight loss.   HENT: No complaints of earache, no hearing loss, no nosebleeds, no nasal discharge, no sore throat, no hoarseness.    Eyes: No complaints of eye pain, no red eyes, no discharge from eyes, no itchy eyes.  Cardiovascular: No complaints of slow heart rate, no fast heart rate, no chest pain, no palpitations, no leg claudication, no lower extremity edema.   Respiratory: No complaints of shortness of breath, no wheezing, no cough, no SOB on exertion, no orthopnea.   Gastrointestinal: As noted in HPI  Genitourinary: No complaints of dysuria, no incontinence, no hesitancy, no nocturia.   Musculoskeletal: No complaints of arthralgia, no myalgias, no joint swelling or stiffness, no limb pain or swelling.   Neurological: No complaints of headache, no confusion, no convulsions, no numbness or tingling, no dizziness or fainting, no limb weakness, no difficulty walking.    Skin: No complaints of skin rash or skin lesions, no itching, no skin wound, no dry skin.    Hematological/Lymphatic: No complaints of swollen glands, does not bleed easy.   Allergic/Immunologic: No immunocompromised state.  Endocrine:  No complaints of polyuria, no polydipsia.  "  Psychiatric/Behavioral: is not suicidal, no sleep disturbances, no anxiety or depression, no change in personality, no emotional problems.       Historical Information   Past Medical History:   Diagnosis Date    GERD (gastroesophageal reflux disease)      Past Surgical History:   Procedure Laterality Date    TUBAL LIGATION       Social History   Social History     Substance and Sexual Activity   Alcohol Use Never     Social History     Substance and Sexual Activity   Drug Use Never     Social History     Tobacco Use   Smoking Status Never   Smokeless Tobacco Never     Family History   Problem Relation Age of Onset    Diabetes Mother     Kidney disease Mother     Hypertension Mother     Stroke Father     Hypertension Sister     Hypertension Brother          Current Medications: has a current medication list which includes the following prescription(s): pantoprazole, triamcinolone, bacitracin, and benzonatate.        Vital Signs: /80   Pulse 78   Ht 5' 4\" (1.626 m)   Wt 83 kg (183 lb)   SpO2 98%   BMI 31.41 kg/m²       Physical Exam:   Constitutional  General Appearance: No acute distress, well appearing and well nourished  Head  Normocephalic  Eyes  Conjunctivae and lids: No swelling, erythema, or discharge.    Pupils and irises: Equal, round and reactive to light.   Ears, Nose, Mouth, and Throat  External inspection of ears and nose: Normal  Nasal mucosa, septum and turbinates: Normal without edema or erythema/   Oropharynx: Normal with no erythema, edema, exudate or lesions.   Neck  Normal range of motion. Neck supple.   Cardiovascular  Auscultation of the heart: Normal rate and rhythm, normal S1 and S2 without murmurs.  Examination of the extremities for edema and/or varicosities: Normal  Pulmonary/Chest  Respiratory effort: No increased work of breathing or signs of respiratory distress.   Auscultation of lungs: Clear to auscultation, equal breath sounds bilaterally, no wheezes, rales, no rhonchi. "   Abdomen  Abdomen: Non-tender, no masses.   Liver and spleen: No hepatomegaly or splenomegaly.   Musculoskeletal  Gait and station: normal.  Digits and Nails: normal without clubbing or cyanosis.  Inspection/palpation of joints, bones, and muscles: Normal  Neurological  No nystagmus or asterixis.   Skin  Skin and subcutaneous tissue: Normal without rashes or lesions.   Lymphatic  Palpation of the lymph nodes in neck: No lymphadenopathy.   Psychiatric  Orientation to person, place and time: Normal.  Mood and affect: Normal.         Labs:  Lab Results   Component Value Date    ALT 17 09/06/2023    AST 15 09/06/2023    BUN 11 09/06/2023    CALCIUM 10.2 (H) 09/06/2023     09/06/2023    CO2 29 09/06/2023    CREATININE 0.81 09/06/2023    HDL 66 05/15/2021    HCT 45.3 05/15/2021    HGB 13.7 05/15/2021     05/15/2021    K 5.0 09/06/2023    TRIG 84 05/15/2021    WBC 6.12 05/15/2021         X-Rays & Procedures:   No orders to display           ______________________________________________________________________      Assessment & Plan:     Diagnoses and all orders for this visit:    Gastroesophageal reflux disease, unspecified whether esophagitis present  -     EGD; Future    Esophageal dysphagia  -     EGD; Future      Patient is advised an EGD.  Further recommendations will depend on study results    I obtained informed consent from the patient. The risks/benefits/alternatives of the procedure were discussed with the patient. Risks included, but not limited to, infection, bleeding, perforation, injury to organs in the abdomen, missed lesion and incomplete procedure were discussed. Patient was agreeable and electronic consent was signed.

## 2024-12-12 ENCOUNTER — NURSE TRIAGE (OUTPATIENT)
Age: 69
End: 2024-12-12

## 2024-12-12 NOTE — TELEPHONE ENCOUNTER
Regarding: mid section abdominal pain  ----- Message from Tamanna HAYS sent at 12/12/2024  3:06 PM EST -----  Patients GI provider:  Dr. Wharton     Number to return call: [phone]     Reason for call: Pt calling requesting to speak with someone regarding mid section burning pain.     Scheduled procedure/appointment date if applicable: Apt/procedure

## 2024-12-12 NOTE — TELEPHONE ENCOUNTER
LOV 24 Dr. Cervantes GERD, currently scheduled w/Dr. Alberts 3/4/25    Patient experiencing reflux symptoms, flared up a few weeks ago. She has been taking TUMS only with minimal relief. She is now watching her diet, following GERD diet and lifestyle. Patient states she has not taken PPI for a while and medication she has on hand are old/. She does state she did get relief previously on omeprazole. She states she never started the pantoprazole previously ordered.    Per protocol I advised patient start OTC Pepcid 20 mg BID and she could continue with TUMS prn breakthrough.    Patient requesting earlier appointment/advise Dr. Wharton only. She is on wait list.  [facility] confirmed if any orders.  Patient instructed to report to ED if severe abdominal pain.  Please/review advise.      Reason for Disposition   The patient has reflux    Answer Assessment - Initial Assessment Questions  1. Do you have a history of GERD?  yes  2. When did your symptoms start? Please describe your symptoms and how often you experience these symptoms.  Started weeks ago   3. Are you taking any acid reducing medications currently such as: Prilosec (Omeprazole), Protonix (Pantoprazole), Prevacid (Lansoprazole), Nexium (Esomeprazole), Aciphex (Rabeprazole), Dexilant (Dexlansoprazole)?not on any medications  4. Have you tried any OTC acid reducing medications? (If so, which medications have you tried?) Zantac (Ranitidine), Pepcid (Famotidine), Tagamet (Cimetidine) Tums, Rolaids, Maalox, Mylanta.  Has tried TUMS   5. What was the outcome of taking the medications which you have for these symptoms?  Minimal relief  6. Have you experienced any recent changes in your bowel habits?  denies  7. Have you had any new life stressors or diet changes?  denies  8. Does anything make your symptoms better?  TUMS minimally  9. Have you had any recent blood work, imaging, or procedures done? If yes, what were those?   denies    Protocols used:  GI-Gerd-ADULT-[place]     Libtayo Pregnancy And Lactation Text: This medication is contraindicated in pregnancy and when breast feeding.

## 2024-12-13 DIAGNOSIS — K21.9 GASTROESOPHAGEAL REFLUX DISEASE WITHOUT ESOPHAGITIS: Primary | ICD-10-CM

## 2024-12-13 RX ORDER — PANTOPRAZOLE SODIUM 40 MG/1
40 TABLET, DELAYED RELEASE ORAL DAILY
Qty: 30 TABLET | Refills: 2 | Status: SHIPPED | OUTPATIENT
Start: 2024-12-13

## 2024-12-13 NOTE — TELEPHONE ENCOUNTER
Called & spoke to patient. Gave patient message as per Dr Wharton. Patient voiced understanding and had no further questions or concerns........

## 2024-12-13 NOTE — TELEPHONE ENCOUNTER
Please tell the patient to take Protonix 40 mg in the morning until her office visit.  She can take Pepcid as needed as well.  Please tell her I sent the prescription to the pharmacy

## 2025-04-29 ENCOUNTER — OFFICE VISIT (OUTPATIENT)
Dept: GASTROENTEROLOGY | Facility: CLINIC | Age: 70
End: 2025-04-29
Payer: MEDICARE

## 2025-04-29 VITALS
DIASTOLIC BLOOD PRESSURE: 89 MMHG | TEMPERATURE: 97.6 F | WEIGHT: 186 LBS | SYSTOLIC BLOOD PRESSURE: 133 MMHG | OXYGEN SATURATION: 98 % | BODY MASS INDEX: 31.76 KG/M2 | HEART RATE: 94 BPM | HEIGHT: 64 IN

## 2025-04-29 DIAGNOSIS — K21.9 GASTROESOPHAGEAL REFLUX DISEASE WITHOUT ESOPHAGITIS: ICD-10-CM

## 2025-04-29 DIAGNOSIS — R10.13 EPIGASTRIC PAIN: ICD-10-CM

## 2025-04-29 DIAGNOSIS — K59.01 SLOW TRANSIT CONSTIPATION: Primary | ICD-10-CM

## 2025-04-29 PROCEDURE — 99204 OFFICE O/P NEW MOD 45 MIN: CPT | Performed by: INTERNAL MEDICINE

## 2025-04-29 PROCEDURE — G2211 COMPLEX E/M VISIT ADD ON: HCPCS | Performed by: INTERNAL MEDICINE

## 2025-04-29 RX ORDER — SODIUM CHLORIDE, SODIUM LACTATE, POTASSIUM CHLORIDE, CALCIUM CHLORIDE 600; 310; 30; 20 MG/100ML; MG/100ML; MG/100ML; MG/100ML
125 INJECTION, SOLUTION INTRAVENOUS CONTINUOUS
Status: CANCELLED | OUTPATIENT
Start: 2025-04-29

## 2025-04-29 RX ORDER — PANTOPRAZOLE SODIUM 40 MG/1
40 TABLET, DELAYED RELEASE ORAL 2 TIMES DAILY
Qty: 60 TABLET | Refills: 4 | Status: SHIPPED | OUTPATIENT
Start: 2025-04-29

## 2025-04-29 NOTE — PROGRESS NOTES
Name: Janice Andres      : 1955      MRN: [id number]  Encounter Provider: Will Wharton III, MD  Encounter Date: 2025   Encounter department: [facility]OENTEROLOG[facility] [place]  :  Assessment & Plan  Gastroesophageal reflux disease without esophagitis    Orders:    pantoprazole (PROTONIX) 40 mg tablet; Take 1 tablet (40 mg total) by mouth 2 (two) times a day    EGD; Future    Slow transit constipation  Is responding to a high-fiber diet       Epigastric pain  She is a 69-year-old female with 6 months of severe chest pain and epigastric abdominal pain that is helped with eating.  She really did not have an improvement with the pantoprazole course over the last 3 months.  She has no other peptic symptoms.  She reports having a normal endoscopy and colonoscopy at [facility] in .  She recently had a negative Cologuard she reports.    1 we will do an EGD to investigate    2 we will increase her pantoprazole to 40 mg p.o. twice daily    3 we will order a right upper quadrant abdominal ultrasound    Orders:    US right upper quadrant; Future    EGD; Future        History of Present Illness     Janice Andres is a 69 y.o. female who presents for evaluation of abdominal pain and chest pain.  She reports severe chest pain severe abdominal pain she said it is not mild she says helped with the eating it was a burning component but is also severe squeezing she has no heartburn she denies dysphagia she denies regurgitation nausea or vomiting.  She has no melena no weight loss no NSAID use.  She reports having a normal endoscopy and colonoscopy in .  She had a Cologuard which was negative this year she reports.  She was prescribed pantoprazole by me over the phone in December and she reports that it has not really helped the pain at all.  It has helped her heartburn and her dysphagia but not her pain.  Responding to a high-fiber diet.  She had a negative endoscopy and  "colonoscopy in 2008 at [facility].  HPI  History obtained from: patient  Review of Systems A complete review of systems is negative other than that noted above in the HPI.    Past Medical History   Past Medical History:   Diagnosis Date    GERD (gastroesophageal reflux disease)      Past Surgical History:   Procedure Laterality Date    TUBAL LIGATION       Family History   Problem Relation Age of Onset    Diabetes Mother     Kidney disease Mother     Hypertension Mother     Stroke Father     Hypertension Sister     Hypertension Brother       reports that she has never smoked. She has never used smokeless tobacco. She reports that she does not drink alcohol and does not use drugs.  Current Outpatient Medications   Medication Instructions    bacitracin ointment 2 times daily    pantoprazole (PROTONIX) 40 mg, Oral, 2 times daily   No Known Allergies   Current Outpatient Medications   Medication Sig Dispense Refill    bacitracin ointment Apply topically 2 (two) times a day      pantoprazole (PROTONIX) 40 mg tablet Take 1 tablet (40 mg total) by mouth 2 (two) times a day 60 tablet 4     No current facility-administered medications for this visit.     Objective   /89 (BP Location: Left arm, Patient Position: Sitting, Cuff Size: Large)   Pulse 94   Temp 97.6 °F (36.4 °C) (Tympanic)   Ht 5' 4\" (1.626 m)   Wt 84.4 kg (186 lb)   SpO2 98%   BMI 31.93 kg/m²     Physical Exam heart S1 is 2 lungs are clear to auscultation bilaterally abdomen is soft nontender positive bowel sounds      Lab Results: I personally reviewed relevant lab results.             "

## 2025-04-29 NOTE — H&P (VIEW-ONLY)
Name: Janice Andres      : 1955      MRN: [id number]  Encounter Provider: Will Wharton III, MD  Encounter Date: 2025   Encounter department: [facility]OENTEROLOG[facility] [place]  :  Assessment & Plan  Gastroesophageal reflux disease without esophagitis    Orders:    pantoprazole (PROTONIX) 40 mg tablet; Take 1 tablet (40 mg total) by mouth 2 (two) times a day    EGD; Future    Slow transit constipation  Is responding to a high-fiber diet       Epigastric pain  She is a 69-year-old female with 6 months of severe chest pain and epigastric abdominal pain that is helped with eating.  She really did not have an improvement with the pantoprazole course over the last 3 months.  She has no other peptic symptoms.  She reports having a normal endoscopy and colonoscopy at [facility] in .  She recently had a negative Cologuard she reports.    1 we will do an EGD to investigate    2 we will increase her pantoprazole to 40 mg p.o. twice daily    3 we will order a right upper quadrant abdominal ultrasound    Orders:    US right upper quadrant; Future    EGD; Future        History of Present Illness     Janice Andres is a 69 y.o. female who presents for evaluation of abdominal pain and chest pain.  She reports severe chest pain severe abdominal pain she said it is not mild she says helped with the eating it was a burning component but is also severe squeezing she has no heartburn she denies dysphagia she denies regurgitation nausea or vomiting.  She has no melena no weight loss no NSAID use.  She reports having a normal endoscopy and colonoscopy in .  She had a Cologuard which was negative this year she reports.  She was prescribed pantoprazole by me over the phone in December and she reports that it has not really helped the pain at all.  It has helped her heartburn and her dysphagia but not her pain.  Responding to a high-fiber diet.  She had a negative endoscopy and  "colonoscopy in 2008 at [facility].  HPI  History obtained from: patient  Review of Systems A complete review of systems is negative other than that noted above in the HPI.    Past Medical History   Past Medical History:   Diagnosis Date    GERD (gastroesophageal reflux disease)      Past Surgical History:   Procedure Laterality Date    TUBAL LIGATION       Family History   Problem Relation Age of Onset    Diabetes Mother     Kidney disease Mother     Hypertension Mother     Stroke Father     Hypertension Sister     Hypertension Brother       reports that she has never smoked. She has never used smokeless tobacco. She reports that she does not drink alcohol and does not use drugs.  Current Outpatient Medications   Medication Instructions    bacitracin ointment 2 times daily    pantoprazole (PROTONIX) 40 mg, Oral, 2 times daily   No Known Allergies   Current Outpatient Medications   Medication Sig Dispense Refill    bacitracin ointment Apply topically 2 (two) times a day      pantoprazole (PROTONIX) 40 mg tablet Take 1 tablet (40 mg total) by mouth 2 (two) times a day 60 tablet 4     No current facility-administered medications for this visit.     Objective   /89 (BP Location: Left arm, Patient Position: Sitting, Cuff Size: Large)   Pulse 94   Temp 97.6 °F (36.4 °C) (Tympanic)   Ht 5' 4\" (1.626 m)   Wt 84.4 kg (186 lb)   SpO2 98%   BMI 31.93 kg/m²     Physical Exam heart S1 is 2 lungs are clear to auscultation bilaterally abdomen is soft nontender positive bowel sounds      Lab Results: I personally reviewed relevant lab results.             "

## 2025-04-29 NOTE — ASSESSMENT & PLAN NOTE
Orders:    pantoprazole (PROTONIX) 40 mg tablet; Take 1 tablet (40 mg total) by mouth 2 (two) times a day    EGD; Future

## 2025-04-29 NOTE — PATIENT INSTRUCTIONS
Scheduled date of EGD(as of today):5/1/25  Physician performing EGD:Dio  Location of EGD:[place]  Instructions reviewed with patient by:Sylvia GALLAGHER  Clearances:  none

## 2025-05-01 ENCOUNTER — ANESTHESIA EVENT (OUTPATIENT)
Dept: GASTROENTEROLOGY | Facility: HOSPITAL | Age: 70
End: 2025-05-01
Payer: MEDICARE

## 2025-05-01 ENCOUNTER — HOSPITAL ENCOUNTER (OUTPATIENT)
Dept: GASTROENTEROLOGY | Facility: HOSPITAL | Age: 70
Setting detail: OUTPATIENT SURGERY
End: 2025-05-01
Attending: INTERNAL MEDICINE
Payer: MEDICARE

## 2025-05-01 ENCOUNTER — ANESTHESIA (OUTPATIENT)
Dept: GASTROENTEROLOGY | Facility: HOSPITAL | Age: 70
End: 2025-05-01
Payer: MEDICARE

## 2025-05-01 VITALS
TEMPERATURE: 97.5 F | WEIGHT: 189.15 LBS | HEIGHT: 64 IN | DIASTOLIC BLOOD PRESSURE: 60 MMHG | HEART RATE: 68 BPM | BODY MASS INDEX: 32.29 KG/M2 | SYSTOLIC BLOOD PRESSURE: 124 MMHG | OXYGEN SATURATION: 98 % | RESPIRATION RATE: 16 BRPM

## 2025-05-01 DIAGNOSIS — R10.13 EPIGASTRIC PAIN: ICD-10-CM

## 2025-05-01 DIAGNOSIS — K21.9 GASTROESOPHAGEAL REFLUX DISEASE WITHOUT ESOPHAGITIS: ICD-10-CM

## 2025-05-01 PROCEDURE — 43239 EGD BIOPSY SINGLE/MULTIPLE: CPT | Performed by: INTERNAL MEDICINE

## 2025-05-01 PROCEDURE — 88341 IMHCHEM/IMCYTCHM EA ADD ANTB: CPT | Performed by: PATHOLOGY

## 2025-05-01 PROCEDURE — 88305 TISSUE EXAM BY PATHOLOGIST: CPT | Performed by: PATHOLOGY

## 2025-05-01 PROCEDURE — 88342 IMHCHEM/IMCYTCHM 1ST ANTB: CPT | Performed by: PATHOLOGY

## 2025-05-01 RX ORDER — PROPOFOL 10 MG/ML
INJECTION, EMULSION INTRAVENOUS AS NEEDED
Status: DISCONTINUED | OUTPATIENT
Start: 2025-05-01 | End: 2025-05-01

## 2025-05-01 RX ADMIN — PROPOFOL 30 MG: 10 INJECTION, EMULSION INTRAVENOUS at 10:02

## 2025-05-01 RX ADMIN — PROPOFOL 20 MG: 10 INJECTION, EMULSION INTRAVENOUS at 10:04

## 2025-05-01 RX ADMIN — PROPOFOL 100 MG: 10 INJECTION, EMULSION INTRAVENOUS at 10:00

## 2025-05-01 NOTE — INTERVAL H&P NOTE
H&P reviewed. After examining the patient I find no changes in the patients condition since the H&P had been written.    Vitals:    05/01/25 0854   BP: 142/75   Pulse: 77   Resp: 18   Temp: (!) 97.2 °F (36.2 °C)   SpO2: 98%

## 2025-05-01 NOTE — ANESTHESIA PREPROCEDURE EVALUATION
Procedure:  EGD    Relevant Problems   ANESTHESIA (within normal limits)      GI/HEPATIC   (+) GERD (gastroesophageal reflux disease)      MUSCULOSKELETAL   (+) Chronic midline low back pain without sciatica      NEURO/PSYCH   (+) Chronic midline low back pain without sciatica   (+) Chronic right shoulder pain        Physical Exam    Airway    Mallampati score: III  TM Distance: >3 FB  Neck ROM: full     Dental        Cardiovascular  Cardiovascular exam normal    Pulmonary  Pulmonary exam normal     Other Findings  post-pubertal.      Anesthesia Plan  ASA Score- 2     Anesthesia Type- IV sedation with anesthesia with ASA Monitors.         Additional Monitors:     Airway Plan:            Plan Factors-Exercise tolerance (METS): >4 METS.    Chart reviewed. EKG reviewed. Imaging results reviewed. Existing labs reviewed. Patient summary reviewed.    Patient is not a current smoker.      Obstructive sleep apnea risk education given perioperatively.        Induction- intravenous.    Postoperative Plan-     Perioperative Resuscitation Plan - Level 1 - Full Code.       Informed Consent- Anesthetic plan and risks discussed with patient.  I personally reviewed this patient with the CRNA. Discussed and agreed on the Anesthesia Plan with the CRNA..      NPO Status:  Vitals Value Taken Time   Date of last liquid 04/30/25 05/01/25 0850   Time of last liquid 1630 05/01/25 0850   Date of last solid 04/30/25 05/01/25 0850   Time of last solid 1630 05/01/25 0850       The common risks and benefits of sedation were discussed including aspiration and respiratory failure, corneal abrasion, injury to teeth and other oropharyngeal structures, and PONV.  I also explained that moderate and deep sedation is not general anesthesia and, though unlikely, there is a chance of awareness including recall as the overall aim of sedation is to maintaining patient comfort during the procedure.  The patient and family were given the opportunity to ask  questions and all questions were addressed at the time of the pre-op evaluation and consent.

## 2025-05-01 NOTE — ANESTHESIA POSTPROCEDURE EVALUATION
Post-Op Assessment Note    CV Status:  Stable    Pain management: adequate       Mental Status:  Alert and awake   Hydration Status:  Euvolemic   PONV Controlled:  Controlled   Airway Patency:  Patent     Post Op Vitals Reviewed: Yes    No anethesia notable event occurred.    Staff: Anesthesiologist, CRNA           Last Filed PACU Vitals:  Vitals Value Taken Time   Temp 97.5 °F (36.4 °C) 05/01/25 1009   Pulse 77 05/01/25 1009   /56 05/01/25 1009   Resp 16 05/01/25 1009   SpO2 96 % 05/01/25 1009       Modified Deandra:     Vitals Value Taken Time   Activity 2 05/01/25 1009   Respiration 2 05/01/25 1009   Circulation 2 05/01/25 1009   Consciousness 1 05/01/25 1009   Oxygen Saturation 2 05/01/25 1009     Modified Deandra Score: 9

## 2025-05-06 ENCOUNTER — RESULTS FOLLOW-UP (OUTPATIENT)
Dept: GASTROENTEROLOGY | Facility: CLINIC | Age: 70
End: 2025-05-06

## 2025-05-06 PROCEDURE — 88305 TISSUE EXAM BY PATHOLOGIST: CPT | Performed by: PATHOLOGY

## 2025-05-06 PROCEDURE — 88341 IMHCHEM/IMCYTCHM EA ADD ANTB: CPT | Performed by: PATHOLOGY

## 2025-05-06 PROCEDURE — 88342 IMHCHEM/IMCYTCHM 1ST ANTB: CPT | Performed by: PATHOLOGY

## 2025-05-13 NOTE — TELEPHONE ENCOUNTER
----- Message from Will Wharton MD sent at 5/12/2025  6:54 AM EDT -----  Please review with the patient

## 2025-05-13 NOTE — TELEPHONE ENCOUNTER
Called and spoke to patient. Gave patient test results.    Patient stated that she is still having stomach discomfort & cramping. Patient denies : fever, chills, nausea, vomiting, diarrhea & bleeding.      She is asking what she should do       Please advise. Thank you !

## 2025-05-14 ENCOUNTER — HOSPITAL ENCOUNTER (OUTPATIENT)
Dept: ULTRASOUND IMAGING | Facility: HOSPITAL | Age: 70
Discharge: HOME | End: 2025-05-14
Attending: INTERNAL MEDICINE
Payer: MEDICARE

## 2025-05-14 ENCOUNTER — TELEPHONE (OUTPATIENT)
Dept: GASTROENTEROLOGY | Facility: CLINIC | Age: 70
End: 2025-05-14

## 2025-05-14 DIAGNOSIS — R10.13 EPIGASTRIC PAIN: ICD-10-CM

## 2025-05-14 DIAGNOSIS — K21.9 GASTROESOPHAGEAL REFLUX DISEASE, UNSPECIFIED WHETHER ESOPHAGITIS PRESENT: ICD-10-CM

## 2025-05-14 DIAGNOSIS — R10.13 EPIGASTRIC PAIN: Primary | ICD-10-CM

## 2025-05-14 DIAGNOSIS — R10.84 GENERALIZED ABDOMINAL PAIN: ICD-10-CM

## 2025-05-14 PROCEDURE — 76705 ECHO EXAM OF ABDOMEN: CPT

## 2025-05-14 RX ORDER — SUCRALFATE 1 G/1
TABLET ORAL
Qty: 56 TABLET | Refills: 0 | Status: SHIPPED | OUTPATIENT
Start: 2025-05-14

## 2025-05-14 RX ORDER — DICYCLOMINE HYDROCHLORIDE 10 MG/1
10 CAPSULE ORAL EVERY 6 HOURS PRN
Qty: 30 CAPSULE | Refills: 1 | Status: SHIPPED | OUTPATIENT
Start: 2025-05-14

## 2025-05-14 NOTE — TELEPHONE ENCOUNTER
Routing to PA    Called & spoke to patient. Gave patient message as per PA..... patient would like the sucralfate and dicyclomine called in  to her pharmacy listed in chart.      Patient voiced understanding and had no further questions or concerns

## 2025-05-14 NOTE — TELEPHONE ENCOUNTER
PA for DICYCLOMINE 10MG SUBMITTED to [facility]    via    [x][employer]-Case ID # [id number]    [x]PA sent as URGENT    All office notes, labs and other pertaining documents and studies sent. Clinical questions answered. Awaiting determination from insurance company.     Turnaround time for your insurance to make a decision on your Prior Authorization can take 7-21 business days.

## 2025-05-14 NOTE — TELEPHONE ENCOUNTER
----- Message from Lynda Graham PA-C sent at 5/14/2025  8:38 AM EDT -----  Dr. Wharton increased her pantoprazole to 40 mg twice daily at her visit at the end of April so she should continue to take this.  She also has an ultrasound to  check her gallbladder scheduled for today I believe and then a stomach emptying test June 2nd to look into other causes for the abdominal pain.    In the meantime, I could give her a stomach cramping medicine to use as needed and see if it helps, it is called dicyclomine. We also could use a medicine to coat her stomach lining for the next 2   weeks call sucralfate and this may help with the gastritis (stomach inflammation) Dr. Wharton saw during her EGD.   If she is agreeable I can send these for her.  ----- Message -----  From: Maureen Gilligan  Sent: 5/13/2025   3:06 PM EDT  To: Lynda Graham PA-C

## 2025-05-15 NOTE — TELEPHONE ENCOUNTER
PA for Dicyclomine 10mg APPROVED     Date(s) approved May 14, 2026     Case #     Patient advised by          []MyChart Message  []Phone call   []LMOM  [x]L/M to call office as no active Communication consent on file  []Unable to leave detailed message as VM not approved on Communication consent       Pharmacy advised by    [x]Fax  []Phone call  []Secure Chat      Approval letter scanned into Media Yes

## 2025-05-21 ENCOUNTER — RESULTS FOLLOW-UP (OUTPATIENT)
Dept: GASTROENTEROLOGY | Facility: CLINIC | Age: 70
End: 2025-05-21

## 2025-05-28 NOTE — TELEPHONE ENCOUNTER
----- Message from Will Wharton MD sent at 5/27/2025  7:09 AM EDT -----  Please review with the patient  ----- Message -----  From: Mychart, Generic  Sent: 5/26/2025   5:00 AM EDT  To: Will Wharton III, MD  Subject: Notification of Unviewed Test Results            BRAN THOMAS has not viewed the following results:  - US RIGHT UPPER QUADRANT

## 2025-06-02 ENCOUNTER — HOSPITAL ENCOUNTER (OUTPATIENT)
Dept: NUCLEAR MEDICINE | Facility: HOSPITAL | Age: 70
Discharge: HOME | End: 2025-06-02
Attending: INTERNAL MEDICINE
Payer: MEDICARE

## 2025-06-02 DIAGNOSIS — R10.13 EPIGASTRIC PAIN: ICD-10-CM

## 2025-06-02 PROCEDURE — A9541 TC99M SULFUR COLLOID: HCPCS

## 2025-06-02 PROCEDURE — 78264 GASTRIC EMPTYING IMG STUDY: CPT

## 2025-06-03 DIAGNOSIS — R10.84 GENERALIZED ABDOMINAL PAIN: ICD-10-CM

## 2025-06-03 RX ORDER — DICYCLOMINE HYDROCHLORIDE 10 MG/1
10 CAPSULE ORAL EVERY 6 HOURS PRN
Qty: 30 CAPSULE | Refills: 2 | Status: SHIPPED | OUTPATIENT
Start: 2025-06-03

## 2025-06-03 NOTE — RESULT ENCOUNTER NOTE
Spoke to her.  She has gastritis and GERD and she has delayed stomach emptying.  Her stomach empties 74% at 4 hours and the normal value is 90%.  She tells me that she feels better when she has smaller more frequent meals and I told her to do this.  I also told her to stay on the pantoprazole and use the dicyclomine as needed.  We will hold on a motility agent at present.

## 2025-06-11 ENCOUNTER — TELEPHONE (OUTPATIENT)
Age: 70
End: 2025-06-11

## 2025-06-11 DIAGNOSIS — R10.13 EPIGASTRIC PAIN: ICD-10-CM

## 2025-06-11 DIAGNOSIS — K21.9 GASTROESOPHAGEAL REFLUX DISEASE, UNSPECIFIED WHETHER ESOPHAGITIS PRESENT: ICD-10-CM

## 2025-06-11 RX ORDER — SUCRALFATE 1 G/1
TABLET ORAL
Qty: 56 TABLET | Refills: 0 | Status: SHIPPED | OUTPATIENT
Start: 2025-06-11

## 2025-06-11 NOTE — TELEPHONE ENCOUNTER
Patients GI provider:  Dr. Wharton    Number to return call: [phone]    Reason for call: Pt is out of Sucralfate 1 GM and requesting a refill be sent to her pharmacy    Scheduled procedure/appointment date if applicable: Apt/procedure NA

## 2025-06-20 NOTE — TELEPHONE ENCOUNTER
"REASON FOR CONVERSATION: Abdominal Pain    SYMPTOMS: Severe 10/10 middle abdominal pain comes and goes, feel full quickly     OTHER HEALTH INFORMATION: Last BM yesterday   She is taking carafate and bentyl   Pantoprazole BID     PROTOCOL DISPOSITION: Go to ED Now    CARE ADVICE PROVIDED: ED     PRACTICE FOLLOW-UP: Pt asking for rec other than reglan pt looked up SE.           Reason for Disposition   SEVERE abdominal pain (e.g., excruciating)    Answer Assessment - Initial Assessment Questions  1. LOCATION: \"Where does it hurt?\"       Middle of abdomen   2. RADIATION: \"Does the pain shoot anywhere else?\" (e.g., chest, back)      No   3. ONSET: \"When did the pain begin?\" (e.g., minutes, hours or days ago)       A while ago   4. SUDDEN: \"Gradual or sudden onset?\"        5. PATTERN \"Does the pain come and go, or is it constant?\"      Comes and goes   6. SEVERITY: \"How bad is the pain?\"  (e.g., Scale 1-10; mild, moderate, or severe)      10/10 cramping   7. RECURRENT SYMPTOM: \"Have you ever had this type of stomach pain before?\" If Yes, ask: \"When was the last time?\" and \"What happened that time?\"       Ongoing   8. CAUSE: \"What do you think is causing the stomach pain?\"      unsure  9. RELIEVING/AGGRAVATING FACTORS: \"What makes it better or worse?\" (e.g., antacids, bending or twisting motion, bowel movement)      Moving worsens   10. OTHER SYMPTOMS: \"Do you have any other symptoms?\" (e.g., back pain, diarrhea, fever, urination pain, vomiting)    Protocols used: Abdominal Pain - Female-Adult-[place]    "

## 2025-07-01 ENCOUNTER — TELEPHONE (OUTPATIENT)
Age: 70
End: 2025-07-01